# Patient Record
Sex: MALE | Race: BLACK OR AFRICAN AMERICAN | Employment: UNEMPLOYED | URBAN - METROPOLITAN AREA
[De-identification: names, ages, dates, MRNs, and addresses within clinical notes are randomized per-mention and may not be internally consistent; named-entity substitution may affect disease eponyms.]

---

## 2018-02-25 ENCOUNTER — HOSPITAL ENCOUNTER (EMERGENCY)
Age: 18
Discharge: HOME | End: 2018-02-25
Attending: EMERGENCY MEDICINE | Admitting: EMERGENCY MEDICINE
Payer: SELF-PAY

## 2018-02-25 ENCOUNTER — APPOINTMENT (OUTPATIENT)
Dept: GENERAL RADIOLOGY | Age: 18
End: 2018-02-25
Attending: EMERGENCY MEDICINE
Payer: SELF-PAY

## 2018-02-25 VITALS
RESPIRATION RATE: 18 BRPM | TEMPERATURE: 98.1 F | BODY MASS INDEX: 21.89 KG/M2 | OXYGEN SATURATION: 97 % | HEIGHT: 72 IN | SYSTOLIC BLOOD PRESSURE: 109 MMHG | WEIGHT: 161.6 LBS | HEART RATE: 70 BPM | DIASTOLIC BLOOD PRESSURE: 57 MMHG

## 2018-02-25 DIAGNOSIS — J45.901 MILD ASTHMA WITH ACUTE EXACERBATION, UNSPECIFIED WHETHER PERSISTENT: Primary | ICD-10-CM

## 2018-02-25 PROCEDURE — 94640 AIRWAY INHALATION TREATMENT: CPT

## 2018-02-25 PROCEDURE — 74011250637 HC RX REV CODE- 250/637: Performed by: EMERGENCY MEDICINE

## 2018-02-25 PROCEDURE — 74011000250 HC RX REV CODE- 250: Performed by: EMERGENCY MEDICINE

## 2018-02-25 PROCEDURE — 77030029684 HC NEB SM VOL KT MONA -A

## 2018-02-25 PROCEDURE — 71046 X-RAY EXAM CHEST 2 VIEWS: CPT

## 2018-02-25 PROCEDURE — 99283 EMERGENCY DEPT VISIT LOW MDM: CPT

## 2018-02-25 RX ORDER — ALBUTEROL SULFATE 90 UG/1
1 AEROSOL, METERED RESPIRATORY (INHALATION)
Status: COMPLETED | OUTPATIENT
Start: 2018-02-25 | End: 2018-02-25

## 2018-02-25 RX ORDER — PREDNISONE 20 MG/1
20 TABLET ORAL DAILY
Qty: 5 TAB | Refills: 0 | Status: SHIPPED | OUTPATIENT
Start: 2018-02-25 | End: 2018-03-02

## 2018-02-25 RX ORDER — IPRATROPIUM BROMIDE AND ALBUTEROL SULFATE 2.5; .5 MG/3ML; MG/3ML
SOLUTION RESPIRATORY (INHALATION)
Status: DISCONTINUED
Start: 2018-02-25 | End: 2018-02-25 | Stop reason: HOSPADM

## 2018-02-25 RX ORDER — ALBUTEROL SULFATE 90 UG/1
AEROSOL, METERED RESPIRATORY (INHALATION)
Status: DISCONTINUED
Start: 2018-02-25 | End: 2018-02-25 | Stop reason: HOSPADM

## 2018-02-25 RX ORDER — IPRATROPIUM BROMIDE AND ALBUTEROL SULFATE 2.5; .5 MG/3ML; MG/3ML
3 SOLUTION RESPIRATORY (INHALATION) ONCE
Status: COMPLETED | OUTPATIENT
Start: 2018-02-25 | End: 2018-02-25

## 2018-02-25 RX ORDER — ALBUTEROL SULFATE 90 UG/1
2 AEROSOL, METERED RESPIRATORY (INHALATION)
Qty: 1 INHALER | Refills: 0 | Status: SHIPPED | OUTPATIENT
Start: 2018-02-25 | End: 2018-12-12

## 2018-02-25 RX ADMIN — ALBUTEROL SULFATE 1 PUFF: 90 AEROSOL, METERED RESPIRATORY (INHALATION) at 01:21

## 2018-02-25 RX ADMIN — IPRATROPIUM BROMIDE AND ALBUTEROL SULFATE 3 ML: .5; 3 SOLUTION RESPIRATORY (INHALATION) at 01:21

## 2018-02-25 NOTE — LETTER
[address] 
[facility] EMERGENCY DEPT 
[address] [place] [address] [address] [id number] [phone] Work/School Note Date: 2/25/2018 To Whom It May concern: 
 
Rhiannon Marino was seen and treated today in the emergency room by the following provider(s): 
Attending Provider: Gloria Swain MD.   
 
Rhiannon Marino may return to work on 02/26/2018. Sincerely, Gloria Swain MD

## 2018-02-25 NOTE — ED PROVIDER NOTES
EMERGENCY DEPARTMENT HISTORY AND PHYSICAL EXAM      Date: 2/25/2018  Patient Name: oHrace Lomeli    History of Presenting Illness     Chief Complaint   Patient presents with    Shortness of Breath     Ambulatory to triage with steady gait. Reporting has been having sob and problems with asthma for the past week. Denies use of inhaler, does not have one. Denies f/c or chest pain at this time. History Provided By: Patient    HPI: Horace Lomeli, 21 y.o. male with PMHx significant for Asthma, presents ambulatory to the ED with cc of SOB x 1 week. Pt endorses associated wheezing, dry cough, and intermittent HA. Pt explains that he has a Hx of Asthma, and his current symptoms are consistent with past Asthma flares. Pt notes that he does not have an inhaler at home, and he did not receive his influenza vaccination for the season. Pt endorses a Hx of tobacco use, and a FHx of Asthma. Pt specifically denies fever, chills, chest pain, or abdominal pain. PCP: PROVIDER UNKNOWN    There are no other complaints, changes, or physical findings at this time. Current Outpatient Prescriptions   Medication Sig Dispense Refill    albuterol (PROVENTIL HFA, VENTOLIN HFA, PROAIR HFA) 90 mcg/actuation inhaler Take 2 Puffs by inhalation every four (4) hours as needed for Wheezing. 1 Inhaler 0    predniSONE (DELTASONE) 20 mg tablet Take 1 Tab by mouth daily for 5 days. With Breakfast 5 Tab 0       Past History     Past Medical History:  Past Medical History:   Diagnosis Date    ADHD (attention deficit hyperactivity disorder)     Asthma        Past Surgical History:  No past surgical history on file. Family History:  No family history on file. Social History:  Social History   Substance Use Topics    Smoking status: Never Smoker    Smokeless tobacco: Never Used    Alcohol use Not on file       Allergies:  No Known Allergies      Review of Systems   Review of Systems   Constitutional: Negative for fever.    HENT: Negative for congestion. Eyes: Negative for visual disturbance. Respiratory: Positive for cough, shortness of breath and wheezing. Cardiovascular: Negative for chest pain. Gastrointestinal: Negative for abdominal pain. Endocrine: Negative for heat intolerance. Genitourinary: Negative. Musculoskeletal: Negative for back pain. Skin: Negative for rash. Allergic/Immunologic: Negative for immunocompromised state. Neurological: Positive for headaches. Hematological: Does not bruise/bleed easily. Psychiatric/Behavioral: Negative. All other systems reviewed and are negative. Physical Exam   Physical Exam   Constitutional: He is oriented to person, place, and time. He appears well-developed and well-nourished. No acute distress   HENT:   Head: Normocephalic and atraumatic. Eyes: EOM are normal. Pupils are equal, round, and reactive to light. Neck: Normal range of motion. Neck supple. Cardiovascular: Normal rate, regular rhythm and normal heart sounds. Pulmonary/Chest: Effort normal and breath sounds normal. He has no wheezes. Abdominal: Soft. Bowel sounds are normal. There is no tenderness. Musculoskeletal: Normal range of motion. He exhibits no edema or tenderness. Neurological: He is alert and oriented to person, place, and time. No cranial nerve deficit. Skin: Skin is warm and dry. Psychiatric: He has a normal mood and affect. His behavior is normal.   Nursing note and vitals reviewed. Diagnostic Study Results     Radiologic Studies -     CXR Results  (Last 48 hours)               02/25/18 0148  XR CHEST PA LAT Final result    Impression:  IMPRESSION:       Normal PA and lateral chest views. Narrative:  EXAM:  XR CHEST PA LAT       INDICATION:  Shortness of breath and asthma. COMPARISON: None       TECHNIQUE: PA and lateral chest views       FINDINGS: The cardiomediastinal and hilar contours are within normal limits.  The   pulmonary vasculature is within normal limits. The lungs and pleural spaces are clear. The visualized bones and upper abdomen   are age-appropriate. Medical Decision Making   I am the first provider for this patient. I reviewed the vital signs, available nursing notes, past medical history, past surgical history, family history and social history. Vital Signs-Reviewed the patient's vital signs. Patient Vitals for the past 12 hrs:   Temp Pulse Resp BP SpO2   02/25/18 0120 98.1 °F (36.7 °C) 70 18 109/57 97 %   02/25/18 0023 98 °F (36.7 °C) (!) 103 16 123/64 98 %       Pulse Oximetry Analysis - 98% on RA    Cardiac Monitor:   Rate: 71 bpm  Rhythm: Normal Sinus Rhythm     Records Reviewed: Nursing Notes and Old Medical Records    Provider Notes (Medical Decision Making):   DDx: Asthma, bronchitis, PNA, URI    ED Course:   Initial assessment performed. The patients presenting problems have been discussed, and they are in agreement with the care plan formulated and outlined with them. I have encouraged them to ask questions as they arise throughout their visit. Tobacco Counseling  Discussed the risks of smoking and the benefits of smoking cessation as well as the long term sequelae of smoking with the patient. The patient verbalized their understanding. Disposition:  2:18 AM  The patient has been re-evaluated and is ready for discharge. Reviewed available results with patient. Counseled patient on diagnosis and care plan. Patient has expressed understanding, and all questions have been answered. Patient agrees with plan and agrees to follow up as recommended, or return to the ED if their symptoms worsen. Discharge instructions have been provided and explained to the patient, along with reasons to return to the ED. PLAN:  1.    Discharge Medication List as of 2/25/2018  2:18 AM      START taking these medications    Details   albuterol (PROVENTIL HFA, VENTOLIN HFA, PROAIR HFA) 90 mcg/actuation inhaler Take 2 Puffs by inhalation every four (4) hours as needed for Wheezing., Normal, Disp-1 Inhaler, R-0      predniSONE (DELTASONE) 20 mg tablet Take 1 Tab by mouth daily for 5 days. With Breakfast, Normal, Disp-5 Tab, R-0           2. Follow-up Information     Follow up With Details Comments Contact Info    see your Dr In 2 days As needed     [facility] EMERGENCY DEPT  If symptoms worsen [address] [place] [address]  [phone]        Return to ED if worse     Diagnosis     Clinical Impression:   1. Mild asthma with acute exacerbation, unspecified whether persistent        Attestations: This note is prepared by Alem Hazel, acting as Scribe for Kvng Haley MD.    Kvng Haley MD: The scribe's documentation has been prepared under my direction and personally reviewed by me in its entirety. I confirm that the note above accurately reflects all work, treatment, procedures, and medical decision making performed by me.

## 2018-02-25 NOTE — DISCHARGE INSTRUCTIONS
Asthma Attack: Care Instructions  Your Care Instructions    During an asthma attack, the airways swell and narrow. This makes it hard to breathe. Severe asthma attacks can be life-threatening, but you can help prevent them by keeping your asthma under control and treating symptoms before they get bad. Symptoms include being short of breath, having chest tightness, coughing, and wheezing. Noting and treating these symptoms can also help you avoid future trips to the emergency room. The doctor has checked you carefully, but problems can develop later. If you notice any problems or new symptoms, get medical treatment right away. Follow-up care is a key part of your treatment and safety. Be sure to make and go to all appointments, and call your doctor if you are having problems. It's also a good idea to know your test results and keep a list of the medicines you take. How can you care for yourself at home? · Follow your asthma action plan to prevent and treat attacks. If you don't have an asthma action plan, work with your doctor to create one. · Take your asthma medicines exactly as prescribed. Talk to your doctor right away if you have any questions about how to take them. ¨ Use your quick-relief medicine when you have symptoms of an attack. Quick-relief medicine is usually an albuterol inhaler. Some people need to use quick-relief medicine before they exercise. ¨ Take your controller medicine every day, not just when you have symptoms. Controller medicine is usually an inhaled corticosteroid. The goal is to prevent problems before they occur. Don't use your controller medicine to treat an attack that has already started. It doesn't work fast enough to help. ¨ If your doctor prescribed corticosteroid pills to use during an attack, take them exactly as prescribed. It may take hours for the pills to work, but they may make the episode shorter and help you breathe better.   ¨ Keep your quick-relief medicine with you at all times. · Talk to your doctor before using other medicines. Some medicines, such as aspirin, can cause asthma attacks in some people. · If you have a peak flow meter, use it to check how well you are breathing. This can help you predict when an asthma attack is going to occur. Then you can take medicine to prevent the asthma attack or make it less severe. · Do not smoke or allow others to smoke around you. Avoid smoky places. Smoking makes asthma worse. If you need help quitting, talk to your doctor about stop-smoking programs and medicines. These can increase your chances of quitting for good. · Learn what triggers an asthma attack for you, and avoid the triggers when you can. Common triggers include colds, smoke, air pollution, dust, pollen, mold, pets, cockroaches, stress, and cold air. · Avoid colds and the flu. Get a pneumococcal vaccine shot. If you have had one before, ask your doctor if you need a second dose. Get a flu vaccine every fall. If you must be around people with colds or the flu, wash your hands often. When should you call for help? Call 911 anytime you think you may need emergency care. For example, call if:  ? · You have severe trouble breathing. ?Call your doctor now or seek immediate medical care if:  ? · Your symptoms do not get better after you have followed your asthma action plan. ? · You have new or worse trouble breathing. ? · Your coughing and wheezing get worse. ? · You cough up dark brown or bloody mucus (sputum). ? · You have a new or higher fever. ? Watch closely for changes in your health, and be sure to contact your doctor if:  ? · You need to use quick-relief medicine on more than 2 days a week (unless it is just for exercise). ? · You cough more deeply or more often, especially if you notice more mucus or a change in the color of your mucus. ? · You are not getting better as expected. Where can you learn more?   Go to http://adriane-liam.info/. Enter D189 in the search box to learn more about \"Asthma Attack: Care Instructions. \"  Current as of: May 12, 2017  Content Version: 11.4  © 9576-4450 [facility]. Care instructions adapted under license by [employer] (which disclaims liability or warranty for this information). If you have questions about a medical condition or this instruction, always ask your healthcare professional. [address] any warranty or liability for your use of this information. Thank you! Thank you for allowing us to provide you with excellent care today. We hope we addressed all of your concerns and needs. We strive to provide excellent quality care in the Emergency Department. You will receive a survey after your visit to evaluate the care you were provided. Please rate us a level 5 (excellent), as anything less than excellent does not meet our goals. If you feel that you have not received excellent quality care or timely care, please ask to speak to the nurse manager. Please choose us in the future for your continued health care needs. ------------------------------------------------------------------------------------------------------------  The exam and treatment you received in the Emergency Department were for an urgent problem and are not intended as complete care. It is important that you follow up with a doctor, nurse practitioner, or physician assistant for ongoing care. If your symptoms become worse or you do not improve as expected and you are unable to reach your usual health care provider, you should return to the Emergency Department. We are available 24 hours a day. Please take your discharge instructions with you when you go to your follow-up appointment. If you have any problem arranging a follow-up appointment, contact the Emergency Department immediately.     If a prescription has been provided, please have it filled as soon as possible to prevent a delay in treatment. Read the entire medication instruction sheet provided to you by the pharmacy. If you have any questions or reservations about taking the medication due to side effects or interactions with other medications, please call your primary care physician or contact the ER to speak with the charge nurse. Make an appointment with your family doctor or the physician you were referred to for follow-up of this visit as instructed on your discharge paperwork, as this is mandatory follow-up. Return to the ER if you are unable to be seen or if you are unable to be seen in a timely manner. If you have any problem arranging the follow-up visit, contact the Emergency Department immediately.

## 2018-11-08 ENCOUNTER — HOSPITAL ENCOUNTER (EMERGENCY)
Age: 18
Discharge: HOME | End: 2018-11-08
Attending: EMERGENCY MEDICINE
Payer: SELF-PAY

## 2018-11-08 VITALS
OXYGEN SATURATION: 98 % | RESPIRATION RATE: 16 BRPM | HEART RATE: 76 BPM | HEIGHT: 72 IN | WEIGHT: 163.8 LBS | TEMPERATURE: 98.1 F | SYSTOLIC BLOOD PRESSURE: 119 MMHG | BODY MASS INDEX: 22.19 KG/M2 | DIASTOLIC BLOOD PRESSURE: 68 MMHG

## 2018-11-08 DIAGNOSIS — N49.2 ABSCESS, SCROTUM: Primary | ICD-10-CM

## 2018-11-08 PROCEDURE — 74011250637 HC RX REV CODE- 250/637

## 2018-11-08 PROCEDURE — 99283 EMERGENCY DEPT VISIT LOW MDM: CPT

## 2018-11-08 PROCEDURE — 74011250637 HC RX REV CODE- 250/637: Performed by: EMERGENCY MEDICINE

## 2018-11-08 RX ORDER — CEPHALEXIN 500 MG/1
500 CAPSULE ORAL 4 TIMES DAILY
Qty: 28 CAP | Refills: 0 | Status: SHIPPED | OUTPATIENT
Start: 2018-11-08 | End: 2018-11-15

## 2018-11-08 RX ORDER — SULFAMETHOXAZOLE AND TRIMETHOPRIM 800; 160 MG/1; MG/1
2 TABLET ORAL
Status: COMPLETED | OUTPATIENT
Start: 2018-11-08 | End: 2018-11-08

## 2018-11-08 RX ORDER — SULFAMETHOXAZOLE AND TRIMETHOPRIM 800; 160 MG/1; MG/1
1 TABLET ORAL 2 TIMES DAILY
Qty: 20 TAB | Refills: 0 | Status: SHIPPED | OUTPATIENT
Start: 2018-11-08 | End: 2018-11-18

## 2018-11-08 RX ORDER — CEPHALEXIN 250 MG/1
500 CAPSULE ORAL
Status: COMPLETED | OUTPATIENT
Start: 2018-11-08 | End: 2018-11-08

## 2018-11-08 RX ORDER — CEPHALEXIN 250 MG/1
CAPSULE ORAL
Status: COMPLETED
Start: 2018-11-08 | End: 2018-11-08

## 2018-11-08 RX ADMIN — CEPHALEXIN 500 MG: 250 CAPSULE ORAL at 23:21

## 2018-11-08 RX ADMIN — SULFAMETHOXAZOLE AND TRIMETHOPRIM 2 TABLET: 800; 160 TABLET ORAL at 23:20

## 2018-11-09 NOTE — ED NOTES
Pt discharged home with written and verbal instructions given by Dr. Queenie Benavides and patient ambulated out of ed without difficulty.

## 2018-11-09 NOTE — ED PROVIDER NOTES
EMERGENCY DEPARTMENT HISTORY AND PHYSICAL EXAM      Date: 11/8/2018  Patient Name: Rosario Alford    History of Presenting Illness     Chief Complaint   Patient presents with    Skin Problem     ambulatory to triage, scrotum, started 3-4 days ago, bled today, denies fever/chills       History Provided By: Patient    HPI: Rosario Alford, 19 y.o. male with PMHx significant for Asthma and abscess, presents ambulatory to the ED with cc of an red, gradually worsening, abscess on the left testicle beginning four days along with associated bloody drainage. Pt reports he had a rectal abscess in the past that went away with antibiotics. He states it was not as large as the one he has now. He endorses Advil, BC powder, and Vaseline with no relief. He denies any other alleviating or exacerbating factors. He specifically denies any fever or chills. Chief Complaint: Abscess  Duration: 4 Days  Timing:  Gradual and Worsening  Location: Left testicle  Quality: None  Severity: Mild  Modifying Factors: None  Associated Symptoms: redness and drainage    There are no other complaints, changes, or physical findings at this time. PCP: Unknown, Provider    Current Outpatient Medications   Medication Sig Dispense Refill    cephALEXin (KEFLEX) 500 mg capsule Take 1 Cap by mouth four (4) times daily for 7 days. 28 Cap 0    trimethoprim-sulfamethoxazole (BACTRIM DS) 160-800 mg per tablet Take 1 Tab by mouth two (2) times a day for 10 days. 20 Tab 0    albuterol (PROVENTIL HFA, VENTOLIN HFA, PROAIR HFA) 90 mcg/actuation inhaler Take 2 Puffs by inhalation every four (4) hours as needed for Wheezing. 1 Inhaler 0       Past History     Past Medical History:  Past Medical History:   Diagnosis Date    ADHD (attention deficit hyperactivity disorder)     Asthma        Past Surgical History:  No past surgical history on file. Family History:  No family history on file.     Social History:  Social History     Tobacco Use    Smoking status: Never Smoker    Smokeless tobacco: Never Used   Substance Use Topics    Alcohol use: Not on file    Drug use: Not on file       Allergies:  No Known Allergies      Review of Systems   Review of Systems   Constitutional: Negative for chills and fever. Respiratory: Negative for cough and shortness of breath. Cardiovascular: Negative for chest pain. Gastrointestinal: Negative for constipation, diarrhea, nausea and vomiting. Skin:        +abscess on the left testicle     Neurological: Negative for weakness and numbness. All other systems reviewed and are negative. Physical Exam   Physical Exam   Constitutional: He is oriented to person, place, and time. He appears well-developed and well-nourished. HENT:   Head: Normocephalic and atraumatic. Eyes: Conjunctivae and EOM are normal.   Neck: Normal range of motion. Neck supple. Cardiovascular: Normal rate and regular rhythm. Pulmonary/Chest: Effort normal and breath sounds normal. No respiratory distress. Abdominal: Soft. He exhibits no distension. There is no tenderness. Musculoskeletal: Normal range of motion. Neurological: He is alert and oriented to person, place, and time. Skin: Skin is warm and dry. Quarter size abscess on the left testicle. Tender to palpation and not actively draining. Psychiatric: He has a normal mood and affect. Nursing note and vitals reviewed. Medical Decision Making   I am the first provider for this patient. I reviewed the vital signs, available nursing notes, past medical history, past surgical history, family history and social history. Vital Signs-Reviewed the patient's vital signs. Patient Vitals for the past 12 hrs:   Temp Pulse Resp BP SpO2   11/08/18 2214 98.1 °F (36.7 °C) 76 16 119/68 98 %     Records Reviewed: Nursing Notes and Old Medical Records    Provider Notes (Medical Decision Making):   Patient presents with fluctuant warm painful lesion concerning for abscess.  No signs of sepsis at this time. Pt is refusing I&D. He will try antibiotics for the next 3 days. He states he will return if there are no improvements    ED Course:   Initial assessment performed. The patients presenting problems have been discussed, and they are in agreement with the care plan formulated and outlined with them. I have encouraged them to ask questions as they arise throughout their visit. PROGRESS NOTE:   11:17 PM  Pt verbalizes agreement with treatment plan. Disposition:  DISCHARGE NOTE  11:17 PM  The patient has been re-evaluated and is ready for discharge. Reviewed available results with patient and family. Counseled pt and family on diagnosis and care plan. Pt and family have expressed understanding, and all questions have been answered. Pt and family agree with plan and agree to F/U as recommended, or return to the ED if their sxs worsen. Discharge instructions have been provided and explained to the pt and their family, along with reasons to return to the ED. Written by Jason Bentley, ED Scribe, as dictated by Myriam Pereyra MD.    PLAN:  1. Current Discharge Medication List      START taking these medications    Details   cephALEXin (KEFLEX) 500 mg capsule Take 1 Cap by mouth four (4) times daily for 7 days. Qty: 28 Cap, Refills: 0      trimethoprim-sulfamethoxazole (BACTRIM DS) 160-800 mg per tablet Take 1 Tab by mouth two (2) times a day for 10 days. Qty: 20 Tab, Refills: 0           2. Follow-up Information     Follow up With Specialties Details Why Contact Info    [facility] In 3 days For wound re-check [address] [place] [address]  [phone]        Return to ED if worse     Diagnosis     Clinical Impression:   1. Abscess, scrotum        Attestations:   This note is prepared by Jason Bentley, acting as Scribe for Myriam Pereyra MD.    Myriam Pereyra MD: The scribe's documentation has been prepared under my direction and personally reviewed by me in its entirety. I confirm that the note above accurately reflects all work, treatment, procedures, and medical decision making performed by me. This note will not be viewable in [address]

## 2018-12-12 ENCOUNTER — HOSPITAL ENCOUNTER (EMERGENCY)
Age: 18
Discharge: HOME | End: 2018-12-12
Attending: EMERGENCY MEDICINE
Payer: SELF-PAY

## 2018-12-12 ENCOUNTER — APPOINTMENT (OUTPATIENT)
Dept: GENERAL RADIOLOGY | Age: 18
End: 2018-12-12
Attending: EMERGENCY MEDICINE
Payer: SELF-PAY

## 2018-12-12 VITALS
DIASTOLIC BLOOD PRESSURE: 64 MMHG | BODY MASS INDEX: 21.53 KG/M2 | TEMPERATURE: 97.8 F | OXYGEN SATURATION: 100 % | RESPIRATION RATE: 16 BRPM | SYSTOLIC BLOOD PRESSURE: 135 MMHG | HEIGHT: 72 IN | WEIGHT: 158.95 LBS | HEART RATE: 88 BPM

## 2018-12-12 DIAGNOSIS — Z72.0 TOBACCO ABUSE: ICD-10-CM

## 2018-12-12 DIAGNOSIS — J20.9 ACUTE BRONCHITIS, UNSPECIFIED ORGANISM: Primary | ICD-10-CM

## 2018-12-12 PROCEDURE — 99282 EMERGENCY DEPT VISIT SF MDM: CPT

## 2018-12-12 PROCEDURE — 71046 X-RAY EXAM CHEST 2 VIEWS: CPT

## 2018-12-12 RX ORDER — AZITHROMYCIN 250 MG/1
TABLET, FILM COATED ORAL
Qty: 6 TAB | Refills: 0 | Status: SHIPPED | OUTPATIENT
Start: 2018-12-12 | End: 2018-12-17

## 2018-12-12 RX ORDER — ALBUTEROL SULFATE 90 UG/1
2 AEROSOL, METERED RESPIRATORY (INHALATION)
Qty: 1 INHALER | Refills: 0 | Status: SHIPPED | OUTPATIENT
Start: 2018-12-12 | End: 2022-07-27

## 2018-12-12 RX ORDER — PREDNISONE 10 MG/1
TABLET ORAL
Qty: 21 TAB | Refills: 0 | Status: SHIPPED | OUTPATIENT
Start: 2018-12-12 | End: 2018-12-18

## 2018-12-12 RX ORDER — BENZONATATE 100 MG/1
100 CAPSULE ORAL
Qty: 30 CAP | Refills: 0 | Status: SHIPPED | OUTPATIENT
Start: 2018-12-12 | End: 2018-12-19

## 2018-12-12 NOTE — DISCHARGE INSTRUCTIONS
[facility] SYSTEMS Departments     For adult and child immunizations, family planning, TB screening, STD testing and women's health services. [facility]: [place] [phone]      [facility] 25   [address]   [address]   170 [facility]: Horacio Horan 200 [facility] [address] [phone]      [address]          [address] [place] [address]     For primary care services, woman and child wellness, and some clinics providing specialty care. [facility] -- [facility] [facility] 2525 [facility] [phone]   411 [facility] 200 [facility] 3614 [facility] [phone]   339 [facility] [address] [facility] [address] [phone] [address]  [employer] [address] [place] [address] 5850  Community  [phone]   [address] [phone]   [facility] 81 [facility] [phone]   [facility] [address] [place] [address] [phone]   [facility] 700 [address], ext [address] [place] [address], [address] [phone]     [facility] [address] [facility] [address] [phone]   Health system Outreach 5850  Community  [phone]   [employer]  [address], [address] ([email or web address]) [phone]         Sexual Health/Woman Wellness Clinics    For STD/HIV testing and treatment, pregnancy testing and services, men's health, birth control services, LGBT services, and hepatitis/HPV vaccine services. [employer] [facility] [place] [employer] 201 [address]. [facility] 75 [facility] [address] [facility] 1579 [address] [phone]   [facility] [address], 5th floor [phone]   Pregnancy [address] [facility] [address]  Gunnar Sánchez [phone]         Specialty Service [address]    [phone]   [facility]   [phone]   Women, Infant and Children's Services: [address] [phone]       600 [facility]   [phone]   [address]   [facility]     [phone]   [address] Crisis   [address] [phone]     Local Primary Care Physicians  [facility] [phone]  MD Frances Renteria MD Lida Fiddler, MD [facility] [phone]  Andrey Carter, P  MD Tomás Valencia MD Abron Bale, MD [address] [address] [phone]  MD Pepper Paz MD [address] [facility] [phone]  MD Rio Carter MD Emeline Hoffmann, MD Armando Baron, MD   [facility] [phone]  MD Keira MURPHY MD Tyronne Gallery, NP [facility] [phone]  [place], MD Parish Siegel MD Marquetta Raker, MD Alyne Kohler, MD Lorena Carson, MD Mindi Artist, MD   33 57 [facility]  Des Marshall MD [facility] [phone]  Leydi Hayes MD  [place] Rod, NP  MD Geoff Black MD Lyndell Pea, MD Bethel Arista, MD Devorah Oto, MD   6596 [facility] [phone]  MD Yandel Zamudio, P  Cricket Sarabia, NP  MD Raven Juarez MD Sabino Dinning, MD Era Barber, MD 8753 [facility] [phone]  Kelsey Angle, MD Karole Friedlander, MD Sidonie Buys, MD France Dubin, MD Sandrea Marts, MD   [address] [phone]  MD Artemio Petersen MD [facility] [phone]  MD Clemencia Smith MD Dorrie Reap [facility], [address] [facility] [phone]  MD Carlton Mcneal MD Rainelle Mask, MD Gloriann Schanz, MD Dionisio Andes, MD Sharlet Chi, ALEX Valdez MD 1619  66   [phone]  MD Jann Bower MD Fontaine Hones, MD   2102 [facility] [phone]  [address] [place] [address] MD Alberto Morocho, FNP  Dania Perez, ROB Perez, P  ROB White MD Staci Halo, ALEX May, DO Miscellaneous:  Rosa Esqueda -[phone]           Bronchitis: Care Instructions  Your Care Instructions    Bronchitis is inflammation of the bronchial tubes, which carry air to the lungs. The tubes swell and produce mucus, or phlegm. The mucus and inflamed bronchial tubes make you cough. You may have trouble breathing. Most cases of bronchitis are caused by viruses like those that cause colds. Antibiotics usually do not help and they may be harmful. Bronchitis usually develops rapidly and lasts about 2 to 3 weeks in otherwise healthy people. Follow-up care is a key part of your treatment and safety. Be sure to make and go to all appointments, and call your doctor if you are having problems. It's also a good idea to know your test results and keep a list of the medicines you take. How can you care for yourself at home? · Take all medicines exactly as prescribed. Call your doctor if you think you are having a problem with your medicine. · Get some extra rest.  · Take an over-the-counter pain medicine, such as acetaminophen (Tylenol), ibuprofen (Advil, Motrin), or naproxen (Aleve) to reduce fever and relieve body aches. Read and follow all instructions on the label. · Do not take two or more pain medicines at the same time unless the doctor told you to. Many pain medicines have acetaminophen, which is Tylenol.  Too much acetaminophen (Tylenol) can be harmful. · Take an over-the-counter cough medicine that contains dextromethorphan to help quiet a dry, hacking cough so that you can sleep. Avoid cough medicines that have more than one active ingredient. Read and follow all instructions on the label. · Breathe moist air from a humidifier, hot shower, or sink filled with hot water. The heat and moisture will thin mucus so you can cough it out. · Do not smoke. Smoking can make bronchitis worse. If you need help quitting, talk to your doctor about stop-smoking programs and medicines. These can increase your chances of quitting for good. When should you call for help? Call 911 anytime you think you may need emergency care. For example, call if:    · You have severe trouble breathing.    Call your doctor now or seek immediate medical care if:    · You have new or worse trouble breathing.     · You cough up dark brown or bloody mucus (sputum).     · You have a new or higher fever.     · You have a new rash.    Watch closely for changes in your health, and be sure to contact your doctor if:    · You cough more deeply or more often, especially if you notice more mucus or a change in the color of your mucus.     · You are not getting better as expected. Where can you learn more? Go to http://adriane-liam.info/. Enter H333 in the search box to learn more about \"Bronchitis: Care Instructions. \"  Current as of: December 6, 2017  Content Version: 11.8  © 2848-2911 [employer]. Care instructions adapted under license by [employer] (which disclaims liability or warranty for this information). If you have questions about a medical condition or this instruction, always ask your healthcare professional. [place] [address] any warranty or liability for your use of this information. Learning About Benefits From Quitting Smoking  How does quitting smoking make you healthier?     If you're thinking about quitting smoking, you may have a few reasons to be smoke-free. Your health may be one of them. · When you quit smoking, you lower your risks for cancer, lung disease, heart attack, stroke, blood vessel disease, and blindness from macular degeneration. · When you're smoke-free, you get sick less often, and you heal faster. You are less likely to get colds, flu, bronchitis, and pneumonia. · As a nonsmoker, you may find that your mood is better and you are less stressed. When and how will you feel healthier? Quitting has real health benefits that start from day 1 of being smoke-free. And the longer you stay smoke-free, the healthier you get and the better you feel. The first hours  · After just 20 minutes, your blood pressure and heart rate go down. That means there's less stress on your heart and blood vessels. · Within 12 hours, the level of carbon monoxide in your blood drops back to normal. That makes room for more oxygen. With more oxygen in your body, you may notice that you have more energy than when you smoked. After 2 weeks  · Your lungs start to work better. · Your risk of heart attack starts to drop. After 1 month  · When your lungs are clear, you cough less and breathe deeper, so it's easier to be active. · Your sense of taste and smell return. That means you can enjoy food more than you have since you started smoking. Over the years  · After 1 year, your risk of heart disease is half what it would be if you kept smoking. · After 5 years, your risk of stroke starts to shrink. Within a few years after that, it's about the same as if you'd never smoked. · After 10 years, your risk of dying from lung cancer is cut by about half. And your risk for many other types of cancer is lower too. How would quitting help others in your life? When you quit smoking, you improve the health of everyone who now breathes in your smoke. · Their heart, lung, and cancer risks drop, much like yours.   · They are sick less. For babies and small children, living smoke-free means they're less likely to have ear infections, pneumonia, and bronchitis. · If you're a woman who is or will be pregnant someday, quitting smoking means a healthier . · Children who are close to you are less likely to become adult smokers. Where can you learn more? Go to http://adriane-liam.info/. Enter 052 [id number] [id number] 11 in the search box to learn more about \"Learning About Benefits From Quitting Smoking. \"  Current as of: 2017  Content Version: 11.8  © 0217-3422 [employer], [employer] Care instructions adapted under license by [employer] (which disclaims liability or warranty for this information). If you have questions about a medical condition or this instruction, always ask your healthcare professional. [address] any warranty or liability for your use of this information.

## 2018-12-12 NOTE — ED PROVIDER NOTES
EMERGENCY DEPARTMENT HISTORY AND PHYSICAL EXAM      Date: 12/12/2018  Patient Name: Dre Bender    History of Presenting Illness     Chief Complaint   Patient presents with    Chest Congestion     pt reports cough and chest congestion x4 days    Cough       History Provided By: Patient    HPI: Dre Bender, 19 y.o. male presents ambulatory to the ED with URI complaints x 4-5 days. The patient notes a productive cough, rhinorrhea, right ear pain, and general malaise. He denies sick contacts. He has not received his flu vaccination this season. He has take VICKS and Veronica seltzer without relief of Sx. There are no other complaints, changes, or physical findings at this time. Social Hx: Tobacco (1 pack of black and milds daily), EtOH (rare), Illicit drug use (denies)     PCP: Unknown, Provider    Current Outpatient Medications   Medication Sig Dispense Refill    albuterol (PROVENTIL HFA, VENTOLIN HFA, PROAIR HFA) 90 mcg/actuation inhaler Take 2 Puffs by inhalation every four (4) hours as needed for Wheezing. 1 Inhaler 0    benzonatate (TESSALON PERLES) 100 mg capsule Take 1 Cap by mouth three (3) times daily as needed for Cough for up to 7 days. 30 Cap 0    azithromycin (ZITHROMAX Z-AIMEE) 250 mg tablet Take 2 tabs today; then take 1 tab daily for 4 days 6 Tab 0    predniSONE (STERAPRED DS) 10 mg dose pack Standard 6 day taper 21 Tab 0       Past History     Past Medical History:  Past Medical History:   Diagnosis Date    ADHD (attention deficit hyperactivity disorder)     Asthma        Past Surgical History:  History reviewed. No pertinent surgical history. Family History:  History reviewed. No pertinent family history.     Social History:  Social History     Tobacco Use    Smoking status: Current Every Day Smoker    Smokeless tobacco: Never Used   Substance Use Topics    Alcohol use: No     Frequency: Never    Drug use: No       Allergies:  No Known Allergies      Review of Systems   Review of Systems   Constitutional: Negative for chills, diaphoresis and fever. HENT: Positive for congestion, ear pain and rhinorrhea. Negative for sore throat. Respiratory: Positive for cough. Negative for shortness of breath. Cardiovascular: Negative for chest pain. Gastrointestinal: Negative for abdominal pain, constipation, diarrhea, nausea and vomiting. Genitourinary: Negative for difficulty urinating, dysuria, frequency and hematuria. Musculoskeletal: Negative for arthralgias and myalgias. Neurological: Negative for headaches. All other systems reviewed and are negative. Physical Exam   Physical Exam   Constitutional: He is oriented to person, place, and time. He appears well-developed and well-nourished. No distress. 19 y.o. -American male    HENT:   Head: Normocephalic and atraumatic. Right Ear: Tympanic membrane, external ear and ear canal normal. Tympanic membrane is not injected. No middle ear effusion. Left Ear: Tympanic membrane, external ear and ear canal normal. Tympanic membrane is not injected. No middle ear effusion. Nose: Rhinorrhea present. Right sinus exhibits no maxillary sinus tenderness and no frontal sinus tenderness. Left sinus exhibits no maxillary sinus tenderness and no frontal sinus tenderness. Mouth/Throat: Uvula is midline and mucous membranes are normal. Posterior oropharyngeal erythema present. No oropharyngeal exudate or posterior oropharyngeal edema. Eyes: Conjunctivae are normal. Right eye exhibits no discharge. Left eye exhibits no discharge. Neck: Normal range of motion. Neck supple. Cardiovascular: Normal rate, regular rhythm and normal heart sounds. No murmur heard. Pulmonary/Chest: Effort normal and breath sounds normal. No respiratory distress. Non-productive cough. Speaking clearly in complete sentences. Lymphadenopathy:     He has no cervical adenopathy. Neurological: He is alert and oriented to person, place, and time. Skin: Skin is warm and dry. He is not diaphoretic. Psychiatric: He has a normal mood and affect. His behavior is normal.   Nursing note and vitals reviewed. Diagnostic Study Results     Labs - None    Radiologic Studies -   CXR Results  (Last 48 hours)               12/12/18 1156  XR CHEST PA LAT Final result    Impression:   impression: No acute changes. Narrative:  Clinical indication: Cough. Frontal and lateral views of the chest obtained, comparison February 25. The a   heart size is normal. There is no acute infiltrate. Medical Decision Making   I am the first provider for this patient. I reviewed the vital signs, available nursing notes, past medical history, past surgical history, family history and social history. Vital Signs-Reviewed the patient's vital signs. Patient Vitals for the past 12 hrs:   Temp Pulse Resp BP SpO2   12/12/18 1224 -- -- -- -- 100 %   12/12/18 1128 97.8 °F (36.6 °C) 88 16 135/64 100 %     Records Reviewed: Nursing Notes    Provider Notes (Medical Decision Making):   Bronchitis, PNA, viral URI, RAD    ED Course:   Initial assessment performed. The patients presenting problems have been discussed, and they are in agreement with the care plan formulated and outlined with them. I have encouraged them to ask questions as they arise throughout their visit. Tobacco Counseling  Discussed the risks of smoking and the benefits of smoking cessation as well as the long term sequelae of smoking with the patient. The patient verbalized their understanding. Counseled patient for approximately 5 minutes. Critical Care Time:   None    Disposition:  DISCHARGE NOTE:  12:27 PM  The pt is ready for discharge. The pt's signs, symptoms, diagnosis, and discharge instructions have been discussed and pt has conveyed their understanding. The pt is to follow up as recommended or return to ER should their symptoms worsen.  Plan has been discussed and pt is in agreement. PLAN:  1. Current Discharge Medication List      START taking these medications    Details   benzonatate (TESSALON PERLES) 100 mg capsule Take 1 Cap by mouth three (3) times daily as needed for Cough for up to 7 days. Qty: 30 Cap, Refills: 0      azithromycin (ZITHROMAX Z-AIMEE) 250 mg tablet Take 2 tabs today; then take 1 tab daily for 4 days  Qty: 6 Tab, Refills: 0      predniSONE (STERAPRED DS) 10 mg dose pack Standard 6 day taper  Qty: 21 Tab, Refills: 0         CONTINUE these medications which have CHANGED    Details   albuterol (PROVENTIL HFA, VENTOLIN HFA, PROAIR HFA) 90 mcg/actuation inhaler Take 2 Puffs by inhalation every four (4) hours as needed for Wheezing. Qty: 1 Inhaler, Refills: 0           2. Follow-up Information     Follow up With Specialties Details Why Contact Info    Your PCP  In 1 week As needed - Please use the attached list of facilities to locate one to meet your non-emergent medical needs       3. Stop smoking  4. Drink plenty of fluids  5. Continue taking over the counter cough/cold medications. Return to ED if worse     Diagnosis     Clinical Impression:   1. Acute bronchitis, unspecified organism    2. Tobacco abuse          This note will not be viewable in MyChart.

## 2018-12-12 NOTE — LETTER
[facility] EMERGENCY DEPT 
[address] [place] [address] [address] [id number] 
[phone] Work/School Note Date: 12/12/2018 To Whom It May concern: 
 
Nelly Guerra was seen and treated today in the emergency room by the following provider(s): 
Attending Provider: Lisa Ramachandran MD 
Physician Assistant: Dinah Castro. Please excuse Nelly Guerra from work today and tomorrow. Sincerely, Dinah Almendarez

## 2018-12-14 ENCOUNTER — HOSPITAL ENCOUNTER (EMERGENCY)
Age: 18
Discharge: HOME | End: 2018-12-14
Attending: EMERGENCY MEDICINE
Payer: SELF-PAY

## 2018-12-14 VITALS
HEART RATE: 66 BPM | TEMPERATURE: 97.7 F | SYSTOLIC BLOOD PRESSURE: 127 MMHG | DIASTOLIC BLOOD PRESSURE: 70 MMHG | WEIGHT: 159.83 LBS | HEIGHT: 72 IN | BODY MASS INDEX: 21.65 KG/M2 | OXYGEN SATURATION: 96 % | RESPIRATION RATE: 16 BRPM

## 2018-12-14 DIAGNOSIS — Z72.0 TOBACCO ABUSE: ICD-10-CM

## 2018-12-14 DIAGNOSIS — J20.9 ACUTE BRONCHITIS, UNSPECIFIED ORGANISM: Primary | ICD-10-CM

## 2018-12-14 PROCEDURE — 99282 EMERGENCY DEPT VISIT SF MDM: CPT

## 2018-12-14 NOTE — LETTER
[facility] EMERGENCY DEPT 
[address] [place] [address] [address] [id number] [phone] Work/School Note Date: 12/14/2018 To Whom It May concern: 
 
Dre Bender was seen and treated today in the emergency room by the following provider(s): 
Attending Provider: Belen Conner MD 
Physician Assistant: Dinah Barbour. Please allow Jamari Cordon to be late to work today. She was in the ER with the above patient who was discharged at 11:30 AM. Sincerely, Dinah Swain

## 2018-12-14 NOTE — DISCHARGE INSTRUCTIONS
Bronchitis: Care Instructions  Your Care Instructions    Bronchitis is inflammation of the bronchial tubes, which carry air to the lungs. The tubes swell and produce mucus, or phlegm. The mucus and inflamed bronchial tubes make you cough. You may have trouble breathing. Most cases of bronchitis are caused by viruses like those that cause colds. Antibiotics usually do not help and they may be harmful. Bronchitis usually develops rapidly and lasts about 2 to 3 weeks in otherwise healthy people. Follow-up care is a key part of your treatment and safety. Be sure to make and go to all appointments, and call your doctor if you are having problems. It's also a good idea to know your test results and keep a list of the medicines you take. How can you care for yourself at home? · Take all medicines exactly as prescribed. Call your doctor if you think you are having a problem with your medicine. · Get some extra rest.  · Take an over-the-counter pain medicine, such as acetaminophen (Tylenol), ibuprofen (Advil, Motrin), or naproxen (Aleve) to reduce fever and relieve body aches. Read and follow all instructions on the label. · Do not take two or more pain medicines at the same time unless the doctor told you to. Many pain medicines have acetaminophen, which is Tylenol. Too much acetaminophen (Tylenol) can be harmful. · Take an over-the-counter cough medicine that contains dextromethorphan to help quiet a dry, hacking cough so that you can sleep. Avoid cough medicines that have more than one active ingredient. Read and follow all instructions on the label. · Breathe moist air from a humidifier, hot shower, or sink filled with hot water. The heat and moisture will thin mucus so you can cough it out. · Do not smoke. Smoking can make bronchitis worse. If you need help quitting, talk to your doctor about stop-smoking programs and medicines. These can increase your chances of quitting for good.   When should you call for help? Call 911 anytime you think you may need emergency care. For example, call if:    · You have severe trouble breathing.    Call your doctor now or seek immediate medical care if:    · You have new or worse trouble breathing.     · You cough up dark brown or bloody mucus (sputum).     · You have a new or higher fever.     · You have a new rash.    Watch closely for changes in your health, and be sure to contact your doctor if:    · You cough more deeply or more often, especially if you notice more mucus or a change in the color of your mucus.     · You are not getting better as expected. Where can you learn more? Go to http://adriane-liam.info/. Enter H333 in the search box to learn more about \"Bronchitis: Care Instructions. \"  Current as of: December 6, 2017  Content Version: 11.8  © 2463-8332 [employer]. Care instructions adapted under license by [employer] (which disclaims liability or warranty for this information). If you have questions about a medical condition or this instruction, always ask your healthcare professional. [address] any warranty or liability for your use of this information.

## 2018-12-14 NOTE — ED PROVIDER NOTES
EMERGENCY DEPARTMENT HISTORY AND PHYSICAL EXAM      Date: 12/14/2018  Patient Name: Rosario Alford    History of Presenting Illness     Chief Complaint   Patient presents with    Cough     Cold sx worse today, pt seen on Tuesday, unable to  and start any meds. History Provided By: Patient    HPI: Rosario Alford, 19 y.o. male returns to the ED with cc of cough and cold Sx. He was seen in the ED several days ago for the same complaint and has not filled any of the Rx medications provided noting that the total cost was $160 and he does not have the money to pay for them. He continues to have a non-productive cough, congestion and ST. There has been no treatment PTA. There are no other complaints, changes, or physical findings at this time. Social Hx: Tobacco (1/2 ppd), EtOH (denies), Illicit drug use (denies)     PCP: None    Current Outpatient Medications   Medication Sig Dispense Refill    albuterol (PROVENTIL HFA, VENTOLIN HFA, PROAIR HFA) 90 mcg/actuation inhaler Take 2 Puffs by inhalation every four (4) hours as needed for Wheezing. 1 Inhaler 0    benzonatate (TESSALON PERLES) 100 mg capsule Take 1 Cap by mouth three (3) times daily as needed for Cough for up to 7 days. 30 Cap 0    azithromycin (ZITHROMAX Z-AIMEE) 250 mg tablet Take 2 tabs today; then take 1 tab daily for 4 days 6 Tab 0    predniSONE (STERAPRED DS) 10 mg dose pack Standard 6 day taper 21 Tab 0       Past History     Past Medical History:  Past Medical History:   Diagnosis Date    ADHD (attention deficit hyperactivity disorder)     Asthma        Past Surgical History:  History reviewed. No pertinent surgical history. Family History:  History reviewed. No pertinent family history.     Social History:  Social History     Tobacco Use    Smoking status: Current Every Day Smoker    Smokeless tobacco: Never Used   Substance Use Topics    Alcohol use: No     Frequency: Never    Drug use: No       Allergies:  No Known Allergies      Review of Systems   Review of Systems   Constitutional: Negative for chills, diaphoresis and fever. HENT: Positive for congestion and sore throat. Negative for ear pain and rhinorrhea. Respiratory: Positive for cough. Negative for shortness of breath. Cardiovascular: Negative for chest pain. Gastrointestinal: Negative for abdominal pain, constipation, diarrhea, nausea and vomiting. Genitourinary: Negative for difficulty urinating, dysuria, frequency and hematuria. Musculoskeletal: Negative for arthralgias and myalgias. Neurological: Negative for headaches. All other systems reviewed and are negative. Physical Exam   Physical Exam   Constitutional: He is oriented to person, place, and time. He appears well-developed and well-nourished. No distress. 19 y.o. -American male    HENT:   Head: Normocephalic and atraumatic. Right Ear: Tympanic membrane, external ear and ear canal normal. No middle ear effusion. Left Ear: Tympanic membrane, external ear and ear canal normal.  No middle ear effusion. Nose: No rhinorrhea. Right sinus exhibits no maxillary sinus tenderness and no frontal sinus tenderness. Left sinus exhibits no maxillary sinus tenderness and no frontal sinus tenderness. Mouth/Throat: Uvula is midline. No oropharyngeal exudate, posterior oropharyngeal edema or posterior oropharyngeal erythema. Eyes: Conjunctivae are normal. Right eye exhibits no discharge. Left eye exhibits no discharge. Neck: Normal range of motion. Neck supple. Cardiovascular: Normal rate, regular rhythm and normal heart sounds. No murmur heard. Pulmonary/Chest: Effort normal and breath sounds normal. No respiratory distress. Non-productive cough. Speaking clearly in complete sentences. Neurological: He is alert and oriented to person, place, and time. Skin: Skin is warm and dry. He is not diaphoretic. Psychiatric: He has a normal mood and affect.  His behavior is normal. Nursing note and vitals reviewed. Diagnostic Study Results     Labs - None    Radiologic Studies - (Radiology result from below from ED visit 2 days ago.)  CXR Results  (Last 48 hours)               12/12/18 1156  XR CHEST PA LAT Final result    Impression:   impression: No acute changes. Narrative:  Clinical indication: Cough. Frontal and lateral views of the chest obtained, comparison February 25. The a   heart size is normal. There is no acute infiltrate. Medical Decision Making   I am the first provider for this patient. I reviewed the vital signs, available nursing notes, past medical history, past surgical history, family history and social history. Vital Signs-Reviewed the patient's vital signs. Patient Vitals for the past 12 hrs:   Temp Pulse Resp BP SpO2   12/14/18 1035 97.7 °F (36.5 °C) 66 16 127/70 96 %     Records Reviewed: Nursing Notes and Old Medical Records    Provider Notes (Medical Decision Making):   Bronchitis, PNA, viral URI, seasonal allergies, medication non-complicant, tobacco abuse    ED Course:   Initial assessment performed. The patients presenting problems have been discussed, and they are in agreement with the care plan formulated and outlined with them. I have encouraged them to ask questions as they arise throughout their visit. Critical Care Time:   None    Disposition:  DISCHARGE NOTE:  10:58 AM  The pt is ready for discharge. The pt's signs, symptoms, diagnosis, and discharge instructions have been discussed and pt has conveyed their understanding. The pt is to follow up as recommended or return to ER should their symptoms worsen. Plan has been discussed and pt is in agreement. PLAN:  1.    Current Discharge Medication List      CONTINUE these medications which have NOT CHANGED    Details   albuterol (PROVENTIL HFA, VENTOLIN HFA, PROAIR HFA) 90 mcg/actuation inhaler Take 2 Puffs by inhalation every four (4) hours as needed for Wheezing. Qty: 1 Inhaler, Refills: 0      benzonatate (TESSALON PERLES) 100 mg capsule Take 1 Cap by mouth three (3) times daily as needed for Cough for up to 7 days. Qty: 30 Cap, Refills: 0      azithromycin (ZITHROMAX Z-AIMEE) 250 mg tablet Take 2 tabs today; then take 1 tab daily for 4 days  Qty: 6 Tab, Refills: 0      predniSONE (STERAPRED DS) 10 mg dose pack Standard 6 day taper  Qty: 21 Tab, Refills: 0           2. Follow-up Information     Follow up With Specialties Details Why Contact Info    Your PCP  In 1 week As needed - Please use the attched list of facilities to locate one to meet your non-emergent medical needs       3. Drink plenty of fluids  4. Take over the counter cough/cold medications as needed  Return to ED if worse     Diagnosis     Clinical Impression:   1. Acute bronchitis, unspecified organism    2. Tobacco abuse          This note will not be viewable in MyChart.

## 2019-02-12 ENCOUNTER — OFFICE VISIT (OUTPATIENT)
Dept: URGENT CARE | Age: 19
End: 2019-02-12

## 2019-02-12 VITALS
HEART RATE: 67 BPM | SYSTOLIC BLOOD PRESSURE: 119 MMHG | RESPIRATION RATE: 16 BRPM | TEMPERATURE: 97.8 F | WEIGHT: 166 LBS | OXYGEN SATURATION: 98 % | BODY MASS INDEX: 23.24 KG/M2 | HEIGHT: 71 IN | DIASTOLIC BLOOD PRESSURE: 58 MMHG

## 2019-02-12 DIAGNOSIS — J40 WHEEZY BRONCHITIS: Primary | ICD-10-CM

## 2019-02-12 RX ORDER — AZITHROMYCIN 250 MG/1
TABLET, FILM COATED ORAL
Qty: 6 TAB | Refills: 0 | Status: ON HOLD | OUTPATIENT
Start: 2019-02-12 | End: 2021-10-14

## 2019-02-12 RX ORDER — PREDNISONE 10 MG/1
TABLET ORAL
Qty: 21 TAB | Refills: 0 | Status: ON HOLD | OUTPATIENT
Start: 2019-02-12 | End: 2021-10-14

## 2019-02-12 NOTE — PATIENT INSTRUCTIONS
Follow up with PCP without improvement over next 5-7 days sooner/immediately for new or worsening      Bronchitis: Care Instructions  Your Care Instructions    Bronchitis is inflammation of the bronchial tubes, which carry air to the lungs. The tubes swell and produce mucus, or phlegm. The mucus and inflamed bronchial tubes make you cough. You may have trouble breathing. Most cases of bronchitis are caused by viruses like those that cause colds. Antibiotics usually do not help and they may be harmful. Bronchitis usually develops rapidly and lasts about 2 to 3 weeks in otherwise healthy people. Follow-up care is a key part of your treatment and safety. Be sure to make and go to all appointments, and call your doctor if you are having problems. It's also a good idea to know your test results and keep a list of the medicines you take. How can you care for yourself at home? · Take all medicines exactly as prescribed. Call your doctor if you think you are having a problem with your medicine. · Get some extra rest.  · Take an over-the-counter pain medicine, such as acetaminophen (Tylenol), ibuprofen (Advil, Motrin), or naproxen (Aleve) to reduce fever and relieve body aches. Read and follow all instructions on the label. · Do not take two or more pain medicines at the same time unless the doctor told you to. Many pain medicines have acetaminophen, which is Tylenol. Too much acetaminophen (Tylenol) can be harmful. · Take an over-the-counter cough medicine that contains dextromethorphan to help quiet a dry, hacking cough so that you can sleep. Avoid cough medicines that have more than one active ingredient. Read and follow all instructions on the label. · Breathe moist air from a humidifier, hot shower, or sink filled with hot water. The heat and moisture will thin mucus so you can cough it out. · Do not smoke. Smoking can make bronchitis worse.  If you need help quitting, talk to your doctor about stop-smoking programs and medicines. These can increase your chances of quitting for good. When should you call for help? Call 911 anytime you think you may need emergency care. For example, call if:    · You have severe trouble breathing.    Call your doctor now or seek immediate medical care if:    · You have new or worse trouble breathing.     · You cough up dark brown or bloody mucus (sputum).     · You have a new or higher fever.     · You have a new rash.    Watch closely for changes in your health, and be sure to contact your doctor if:    · You cough more deeply or more often, especially if you notice more mucus or a change in the color of your mucus.     · You are not getting better as expected. Where can you learn more? Go to http://adriane-liam.info/. Enter H333 in the search box to learn more about \"Bronchitis: Care Instructions. \"  Current as of: September 5, 2018  Content Version: 11.9  © 9946-1096 [employer]. Care instructions adapted under license by [employer] (which disclaims liability or warranty for this information). If you have questions about a medical condition or this instruction, always ask your healthcare professional. [address] any warranty or liability for your use of this information.

## 2019-02-12 NOTE — LETTER
NOTIFICATION RETURN TO WORK / SCHOOL 
 
2/12/2019 5:19 PM 
 
Mr. [address] [address] [place] [address] To Whom It May Concern: 
 
Jameel Ramirez is currently under the care of [facility]. He will return to work/school on: 02/14 OR 02/15/2019 If there are questions or concerns please have the patient contact our office. Sincerely, GHE PROVIDER

## 2019-02-12 NOTE — PROGRESS NOTES
Cough x 1 week  Tight to occasionally productive  promotes hx of asthma has been using inhaler at home daily  No fever, chills, weakness or vomiting  Overall not improving. Denies any other symptoms. Is a daily smoker               Past Medical History:   Diagnosis Date    ADHD (attention deficit hyperactivity disorder)     Asthma         History reviewed. No pertinent surgical history. History reviewed. No pertinent family history. Social History     Socioeconomic History    Marital status: SINGLE     Spouse name: Not on file    Number of children: Not on file    Years of education: Not on file    Highest education level: Not on file   Social Needs    Financial resource strain: Not on file    Food insecurity - worry: Not on file    Food insecurity - inability: Not on file    Transportation needs - medical: Not on file   [employer] needs - non-medical: Not on file   Occupational History    Not on file   Tobacco Use    Smoking status: Current Every Day Smoker    Smokeless tobacco: Never Used   Substance and Sexual Activity    Alcohol use: No     Frequency: Never    Drug use: No    Sexual activity: Not on file   Other Topics Concern    Not on file   Social History Narrative    Not on file                ALLERGIES: Patient has no known allergies. Review of Systems   All other systems reviewed and are negative. Vitals:    02/12/19 1659   BP: 119/58   Pulse: 67   Resp: 16   Temp: 97.8 °F (36.6 °C)   SpO2: 98%   Weight: 166 lb (75.3 kg)   Height: 5' 11\" (1.803 m)       Physical Exam   Constitutional: He is oriented to person, place, and time. No distress. Non-toxic appearing, well hydrated   HENT:   Head: Normocephalic and atraumatic. Nose: Nose normal.   Mouth/Throat: Oropharynx is clear and moist. No oropharyngeal exudate. Eyes: Conjunctivae and EOM are normal. Pupils are equal, round, and reactive to light. No scleral icterus. Neck: Normal range of motion.  Neck supple. Cardiovascular: Normal rate, regular rhythm and normal heart sounds. Exam reveals no gallop and no friction rub. No murmur heard. Pulmonary/Chest: Effort normal. No respiratory distress. He has wheezes. He has no rales. He exhibits no tenderness. Lymphadenopathy:     He has no cervical adenopathy. Neurological: He is alert and oriented to person, place, and time. Skin: Skin is warm and dry. No rash noted. He is not diaphoretic. No erythema. No pallor. Psychiatric: He has a normal mood and affect. His behavior is normal. Thought content normal.   Nursing note and vitals reviewed. MDM     Differential Diagnosis; Clinical Impression; Plan:       CLINICAL IMPRESSION:  (J40) Wheezy bronchitis  (primary encounter diagnosis)    Orders Placed This Encounter      predniSONE (STERAPRED DS) 10 mg dose pack          Sig: Take per dose pack instructions          Dispense:  21 Tab          Refill:  0      azithromycin (ZITHROMAX) 250 mg tablet          Sig: Take 2 tablets on day 1 then 1 tablet per day for remaining 4 days. Take by mouth. Dispense:  6 Tab          Refill:  0      Plan:  See above orders  Continue albuterol  Review handouts        We have reviewed concerning signs/symptoms, normal vs abnormal progression of medical condition and when to seek immediate medical attention. Schedule with PCP or Urgent Care immediately for worsening or new symptoms. See your PCP if there is not at least some improvement in symptoms within the next 1 week  You should see your PCP for updates on your routine health maintenance.              Procedures

## 2021-09-10 ENCOUNTER — APPOINTMENT (OUTPATIENT)
Dept: GENERAL RADIOLOGY | Age: 21
End: 2021-09-10
Attending: EMERGENCY MEDICINE
Payer: MEDICAID

## 2021-09-10 ENCOUNTER — HOSPITAL ENCOUNTER (EMERGENCY)
Age: 21
Discharge: HOME | End: 2021-09-10
Attending: EMERGENCY MEDICINE
Payer: MEDICAID

## 2021-09-10 VITALS
DIASTOLIC BLOOD PRESSURE: 70 MMHG | OXYGEN SATURATION: 96 % | RESPIRATION RATE: 16 BRPM | WEIGHT: 157 LBS | TEMPERATURE: 98.4 F | HEART RATE: 92 BPM | HEIGHT: 72 IN | BODY MASS INDEX: 21.26 KG/M2 | SYSTOLIC BLOOD PRESSURE: 127 MMHG

## 2021-09-10 DIAGNOSIS — Z20.822 PERSON UNDER INVESTIGATION FOR COVID-19: ICD-10-CM

## 2021-09-10 DIAGNOSIS — R05.9 COUGH: Primary | ICD-10-CM

## 2021-09-10 PROCEDURE — 71045 X-RAY EXAM CHEST 1 VIEW: CPT

## 2021-09-10 PROCEDURE — 99282 EMERGENCY DEPT VISIT SF MDM: CPT

## 2021-09-10 RX ORDER — HYDROCODONE POLISTIREX AND CHLORPHENIRAMINE POLISTIREX 10; 8 MG/5ML; MG/5ML
5 SUSPENSION, EXTENDED RELEASE ORAL
Qty: 30 ML | Refills: 0 | Status: SHIPPED | OUTPATIENT
Start: 2021-09-10 | End: 2021-09-13

## 2021-09-10 RX ORDER — HYDROCODONE BITARTRATE AND HOMATROPINE METHYLBROMIDE 1.5; 5 MG/5ML; MG/5ML
5 SYRUP ORAL
Qty: 30 ML | Refills: 0 | Status: SHIPPED | OUTPATIENT
Start: 2021-09-10 | End: 2021-09-13

## 2021-09-10 NOTE — ED NOTES
Sitting quietly on stretcher in NAD. Respirations even and unlabored. Emergency Department Nursing Plan of Care       The Nursing Plan of Care is developed from the Nursing assessment and Emergency Department Attending provider initial evaluation. The plan of care may be reviewed in the ED Provider note.     The Plan of Care was developed with the following considerations:   Patient / Family readiness to learn indicated by:verbalized understanding  Persons(s) to be included in education: patient  Barriers to Learning/Limitations:No    Signed     Sakina Bigelow, formerly [facility]    9/10/2021   10:01 AM

## 2021-09-10 NOTE — ED NOTES
Patient  given copy of dc instructions and 1 script(s). Patient verbalized understanding of instructions and script (s). Patient given a current medication reconciliation form and verbalized understanding of their medications. Patient  verbalized understanding of the importance of discussing medications with  his or her physician or clinic they will be following up with. Patient alert and oriented and in no acute distress. Patient discharged home ambulatory .

## 2021-09-10 NOTE — ED TRIAGE NOTES
Pt reports cough x few days. Reports going to [facility] yesterday and was tested for COVID which is pending. Pt reports meds given yesterday aren't helping.

## 2021-09-10 NOTE — ED PROVIDER NOTES
EMERGENCY DEPARTMENT HISTORY AND PHYSICAL EXAM      Date: 9/10/2021  Patient Name: Chastity Huffman    History of Presenting Illness     Chief Complaint   Patient presents with    Concern For [id number] (Coronavirus)       History Provided By: Patient    HPI: Chastity Huffman, 19 y.o. male with PMHx as noted below presents the emergency department with chief complaint of cough and congestion. Patient states that symptom started 2 to 3 days ago and notes a cough and body aches. Patient was seen at an outside facility yesterday and had a Covid test sent which is currently pending. Patient states his cough is persisted despite being prescribed medications from that facility so is coming here for additional evaluation. Patient does note some discomfort with cough that is having no chest pain while he is not coughing. Denying any shortness of breath, nausea, vomiting or abdominal pain. PCP: None    Current Outpatient Medications   Medication Sig Dispense Refill    HYDROcodone-homatropine (HYCODAN) 5-1.5 mg/5 mL (5 mL) oral solution Take 5 mL by mouth three (3) times daily as needed for Cough for up to 3 days. Max Daily Amount: 15 mL. 30 mL 0    predniSONE (STERAPRED DS) 10 mg dose pack Take per dose pack instructions 21 Tab 0    azithromycin (ZITHROMAX) 250 mg tablet Take 2 tablets on day 1 then 1 tablet per day for remaining 4 days. Take by mouth. 6 Tab 0    albuterol (PROVENTIL HFA, VENTOLIN HFA, PROAIR HFA) 90 mcg/actuation inhaler Take 2 Puffs by inhalation every four (4) hours as needed for Wheezing. 1 Inhaler 0       Past History     Past Medical History:  Past Medical History:   Diagnosis Date    ADHD (attention deficit hyperactivity disorder)     Asthma        Past Surgical History:  No past surgical history on file. Family History:  No family history on file.     Social History:  Social History     Tobacco Use    Smoking status: Current Every Day Smoker    Smokeless tobacco: Never Used   Substance Use Topics    Alcohol use: No    Drug use: No       Allergies:  No Known Allergies      Review of Systems   Review of Systems  Constitutional: Negative for fever. Positive chills, and fatigue. Pulmonary: Negative shortness of breath, positive cough  Cardiovascular, negative chest pain, rotations    Physical Exam   Physical Exam    GENERAL: alert and oriented, no acute distress  EYES: PEERL, No injection, discharge or icterus. ENT: Mucous membranes pink and moist.  NECK: Supple  LUNGS: Airway patent. Non-labored respirations. Breath sounds clear  HEART: Regular rate and rhythm. ABDOMEN: Non-distended  SKIN:  warm, dry  MSK/EXTREMITIES: Without deformity  NEUROLOGICAL: Alert, oriented      Diagnostic Study Results     Labs -   No results found for this or any previous visit (from the past 12 hour(s)). Radiologic Studies -   XR CHEST PORT   Final Result   No acute abnormality identified. CT Results  (Last 48 hours)    None        CXR Results  (Last 48 hours)               09/10/21 1010  XR CHEST PORT Final result    Impression:  No acute abnormality identified. Narrative:  EXAM:  XR CHEST PORT       INDICATION:  Chest Pain       COMPARISON:  2018       FINDINGS: A portable AP radiograph of the chest was obtained at 1006 hours. .    The lungs are clear. The cardiac and mediastinal contours and pulmonary   vascularity are normal.  The bones and soft tissues are grossly within normal   limits. Medical Decision Making   I, Debbie Nagy MD am the first provider for this patient and am the attending of record for this patient encounter. I reviewed the vital signs, available nursing notes, past medical history, past surgical history, family history and social history. Vital Signs-Reviewed the patient's vital signs.   Patient Vitals for the past 12 hrs:   Temp Pulse Resp BP SpO2   09/10/21 0848 98.4 °F (36.9 °C) 92 16 127/70 96 %         Records Reviewed: Nursing Notes and Old Medical Records    Provider Notes (Medical Decision Making): On presentation, the patient is well appearing, in no acute distress with reassuring vital signs. Based on my history and exam the differential diagnosis for this patient includes COVID 19, URI, sinusitis, bronchitis. Nothing to suggest strep pharyngitis, PNA or bacterial sinusitis. X-ray showed no acute findings of interpretation. Patient may have COVID 19, however is breathing comfortably, maintaining adequate O2 sats on room air and is overall well appearing so does not require admission at this time. .  Have recommended self quarantine per CDC guidelines. Symptomatic therapy suggested. Should return immediately to the emergency department develops shortness of breath, confusion, weakness or any othe new/worrisome symptoms       ED Course:   Initial assessment performed. The patients presenting problems have been discussed, and they are in agreement with the care plan formulated and outlined with them. I have encouraged them to ask questions as they arise throughout their visit. Medications - No data to display      [address]  results have been reviewed with him. He has been counseled regarding his diagnosis. He verbally conveys understanding and agreement of the signs, symptoms, diagnosis, treatment and prognosis and additionally agrees to follow up as recommended with Dr. None in 24 - 48 hours. He also agrees with the care-plan and conveys that all of his questions have been answered. I have also put together some discharge instructions for him that include: 1) educational information regarding their diagnosis, 2) how to care for their diagnosis at home, as well a 3) list of reasons why they would want to return to the ED prior to their follow-up appointment, should their condition change. Disposition:  home    PLAN:  1.    Discharge Medication List as of 9/10/2021 10:21 AM      START taking these medications    Details   HYDROcodone-homatropine (HYCODAN) 5-1.5 mg/5 mL (5 mL) oral solution Take 5 mL by mouth three (3) times daily as needed for Cough for up to 3 days. Max Daily Amount: 15 mL., Normal, Disp-30 mL, R-0         CONTINUE these medications which have NOT CHANGED    Details   predniSONE (STERAPRED DS) 10 mg dose pack Take per dose pack instructions, Normal, Disp-21 Tab, R-0      azithromycin (ZITHROMAX) 250 mg tablet Take 2 tablets on day 1 then 1 tablet per day for remaining 4 days. Take by mouth., Normal, Disp-6 Tab, R-0      albuterol (PROVENTIL HFA, VENTOLIN HFA, PROAIR HFA) 90 mcg/actuation inhaler Take 2 Puffs by inhalation every four (4) hours as needed for Wheezing., Normal, Disp-1 Inhaler, R-0           2. Follow-up Information     Follow up With Specialties Details Why [facility] EMERGENCY DEPT Emergency Medicine  If symptoms worsen Lazara Burk        Return to ED if worse     Diagnosis     Clinical Impression:   1. Cough    2. Person under investigation for COVID-19        Please note that this dictation was completed with Dragon, computer voice recognition software. Quite often unanticipated grammatical, syntax, homophones, and other interpretive errors are inadvertently transcribed by the computer software. Please disregard these errors. Additionally, please excuse any errors that have escaped final proofreading.

## 2021-10-14 ENCOUNTER — HOSPITAL ENCOUNTER (INPATIENT)
Age: 21
LOS: 1 days | Discharge: HOME | DRG: 754 | End: 2021-10-14
Attending: EMERGENCY MEDICINE | Admitting: PSYCHIATRY & NEUROLOGY
Payer: MEDICAID

## 2021-10-14 VITALS
WEIGHT: 157 LBS | RESPIRATION RATE: 16 BRPM | HEIGHT: 72 IN | SYSTOLIC BLOOD PRESSURE: 135 MMHG | DIASTOLIC BLOOD PRESSURE: 85 MMHG | BODY MASS INDEX: 21.26 KG/M2 | OXYGEN SATURATION: 99 % | HEART RATE: 79 BPM | TEMPERATURE: 98.6 F

## 2021-10-14 DIAGNOSIS — R45.851 SUICIDAL IDEATION: Primary | ICD-10-CM

## 2021-10-14 PROBLEM — F39 UNSPECIFIED MOOD (AFFECTIVE) DISORDER (HCC): Status: ACTIVE | Noted: 2021-10-14

## 2021-10-14 LAB
ALBUMIN SERPL-MCNC: 4.2 G/DL (ref 3.5–5)
ALBUMIN/GLOB SERPL: 1.1 {RATIO} (ref 1.1–2.2)
ALP SERPL-CCNC: 74 U/L (ref 45–117)
ALT SERPL-CCNC: 16 U/L (ref 12–78)
AMPHET UR QL SCN: NEGATIVE
ANION GAP SERPL CALC-SCNC: 4 MMOL/L (ref 5–15)
APAP SERPL-MCNC: <2 UG/ML (ref 10–30)
APPEARANCE UR: CLEAR
AST SERPL-CCNC: 19 U/L (ref 15–37)
BACTERIA URNS QL MICRO: NEGATIVE /HPF
BARBITURATES UR QL SCN: NEGATIVE
BASOPHILS # BLD: 0 K/UL (ref 0–0.1)
BASOPHILS NFR BLD: 0 % (ref 0–1)
BENZODIAZ UR QL: NEGATIVE
BILIRUB SERPL-MCNC: 0.4 MG/DL (ref 0.2–1)
BILIRUB UR QL: NEGATIVE
BUN SERPL-MCNC: 13 MG/DL (ref 6–20)
BUN/CREAT SERPL: 13 (ref 12–20)
CALCIUM SERPL-MCNC: 9.6 MG/DL (ref 8.5–10.1)
CANNABINOIDS UR QL SCN: POSITIVE
CHLORIDE SERPL-SCNC: 105 MMOL/L (ref 97–108)
CO2 SERPL-SCNC: 27 MMOL/L (ref 21–32)
COCAINE UR QL SCN: NEGATIVE
COLOR UR: NORMAL
COMMENT, HOLDF: NORMAL
CREAT SERPL-MCNC: 0.99 MG/DL (ref 0.7–1.3)
DIFFERENTIAL METHOD BLD: NORMAL
DRUG SCRN COMMENT,DRGCM: ABNORMAL
EOSINOPHIL # BLD: 0.3 K/UL (ref 0–0.4)
EOSINOPHIL NFR BLD: 4 % (ref 0–7)
EPITH CASTS URNS QL MICRO: NORMAL /LPF
ERYTHROCYTE [DISTWIDTH] IN BLOOD BY AUTOMATED COUNT: 12.6 % (ref 11.5–14.5)
ETHANOL SERPL-MCNC: <10 MG/DL
FLUAV RNA SPEC QL NAA+PROBE: NOT DETECTED
FLUBV RNA SPEC QL NAA+PROBE: NOT DETECTED
GLOBULIN SER CALC-MCNC: 3.7 G/DL (ref 2–4)
GLUCOSE SERPL-MCNC: 94 MG/DL (ref 65–100)
GLUCOSE UR STRIP.AUTO-MCNC: NEGATIVE MG/DL
HCT VFR BLD AUTO: 41.3 % (ref 36.6–50.3)
HGB BLD-MCNC: 13.9 G/DL (ref 12.1–17)
HGB UR QL STRIP: NEGATIVE
IMM GRANULOCYTES # BLD AUTO: 0 K/UL (ref 0–0.04)
IMM GRANULOCYTES NFR BLD AUTO: 0 % (ref 0–0.5)
KETONES UR QL STRIP.AUTO: NEGATIVE MG/DL
LEUKOCYTE ESTERASE UR QL STRIP.AUTO: NEGATIVE
LYMPHOCYTES # BLD: 2.8 K/UL (ref 0.8–3.5)
LYMPHOCYTES NFR BLD: 36 % (ref 12–49)
MAGNESIUM SERPL-MCNC: 2.3 MG/DL (ref 1.6–2.4)
MCH RBC QN AUTO: 30.6 PG (ref 26–34)
MCHC RBC AUTO-ENTMCNC: 33.7 G/DL (ref 30–36.5)
MCV RBC AUTO: 91 FL (ref 80–99)
METHADONE UR QL: NEGATIVE
MONOCYTES # BLD: 0.8 K/UL (ref 0–1)
MONOCYTES NFR BLD: 10 % (ref 5–13)
NEUTS SEG # BLD: 3.9 K/UL (ref 1.8–8)
NEUTS SEG NFR BLD: 50 % (ref 32–75)
NITRITE UR QL STRIP.AUTO: NEGATIVE
NRBC # BLD: 0 K/UL (ref 0–0.01)
NRBC BLD-RTO: 0 PER 100 WBC
OPIATES UR QL: NEGATIVE
PCP UR QL: NEGATIVE
PH UR STRIP: 7 [PH] (ref 5–8)
PLATELET # BLD AUTO: 253 K/UL (ref 150–400)
PMV BLD AUTO: 9.7 FL (ref 8.9–12.9)
POTASSIUM SERPL-SCNC: 3.5 MMOL/L (ref 3.5–5.1)
PROT SERPL-MCNC: 7.9 G/DL (ref 6.4–8.2)
PROT UR STRIP-MCNC: NEGATIVE MG/DL
RBC # BLD AUTO: 4.54 M/UL (ref 4.1–5.7)
RBC #/AREA URNS HPF: NORMAL /HPF (ref 0–5)
SALICYLATES SERPL-MCNC: <1.7 MG/DL (ref 2.8–20)
SAMPLES BEING HELD,HOLD: NORMAL
SARS-COV-2, COV2: NOT DETECTED
SODIUM SERPL-SCNC: 136 MMOL/L (ref 136–145)
SP GR UR REFRACTOMETRY: 1.01 (ref 1–1.03)
UR CULT HOLD, URHOLD: NORMAL
UROBILINOGEN UR QL STRIP.AUTO: 0.2 EU/DL (ref 0.2–1)
WBC # BLD AUTO: 7.8 K/UL (ref 4.1–11.1)
WBC URNS QL MICRO: NORMAL /HPF (ref 0–4)

## 2021-10-14 PROCEDURE — 80179 DRUG ASSAY SALICYLATE: CPT

## 2021-10-14 PROCEDURE — 80143 DRUG ASSAY ACETAMINOPHEN: CPT

## 2021-10-14 PROCEDURE — 65220000003 HC RM SEMIPRIVATE PSYCH

## 2021-10-14 PROCEDURE — 99285 EMERGENCY DEPT VISIT HI MDM: CPT

## 2021-10-14 PROCEDURE — 80053 COMPREHEN METABOLIC PANEL: CPT

## 2021-10-14 PROCEDURE — 83735 ASSAY OF MAGNESIUM: CPT

## 2021-10-14 PROCEDURE — 87636 SARSCOV2 & INF A&B AMP PRB: CPT

## 2021-10-14 PROCEDURE — 85025 COMPLETE CBC W/AUTO DIFF WBC: CPT

## 2021-10-14 PROCEDURE — 36415 COLL VENOUS BLD VENIPUNCTURE: CPT

## 2021-10-14 PROCEDURE — 80307 DRUG TEST PRSMV CHEM ANLYZR: CPT

## 2021-10-14 PROCEDURE — 81001 URINALYSIS AUTO W/SCOPE: CPT

## 2021-10-14 PROCEDURE — 82077 ASSAY SPEC XCP UR&BREATH IA: CPT

## 2021-10-14 RX ORDER — LORAZEPAM 2 MG/ML
1 INJECTION INTRAMUSCULAR
Status: DISCONTINUED | OUTPATIENT
Start: 2021-10-14 | End: 2021-10-14 | Stop reason: HOSPADM

## 2021-10-14 RX ORDER — OLANZAPINE 5 MG/1
5 TABLET ORAL
Status: DISCONTINUED | OUTPATIENT
Start: 2021-10-14 | End: 2021-10-14 | Stop reason: HOSPADM

## 2021-10-14 RX ORDER — ADHESIVE BANDAGE
30 BANDAGE TOPICAL DAILY PRN
Status: DISCONTINUED | OUTPATIENT
Start: 2021-10-14 | End: 2021-10-14 | Stop reason: HOSPADM

## 2021-10-14 RX ORDER — ACETAMINOPHEN 325 MG/1
650 TABLET ORAL
Status: DISCONTINUED | OUTPATIENT
Start: 2021-10-14 | End: 2021-10-14 | Stop reason: HOSPADM

## 2021-10-14 RX ORDER — HALOPERIDOL 5 MG/ML
5 INJECTION INTRAMUSCULAR
Status: DISCONTINUED | OUTPATIENT
Start: 2021-10-14 | End: 2021-10-14 | Stop reason: HOSPADM

## 2021-10-14 RX ORDER — BENZTROPINE MESYLATE 1 MG/1
1 TABLET ORAL
Status: DISCONTINUED | OUTPATIENT
Start: 2021-10-14 | End: 2021-10-14 | Stop reason: HOSPADM

## 2021-10-14 RX ORDER — TRAZODONE HYDROCHLORIDE 50 MG/1
50 TABLET ORAL
Status: DISCONTINUED | OUTPATIENT
Start: 2021-10-14 | End: 2021-10-14 | Stop reason: HOSPADM

## 2021-10-14 RX ORDER — HYDROXYZINE 50 MG/1
50 TABLET, FILM COATED ORAL
Status: DISCONTINUED | OUTPATIENT
Start: 2021-10-14 | End: 2021-10-14 | Stop reason: HOSPADM

## 2021-10-14 RX ORDER — DIPHENHYDRAMINE HYDROCHLORIDE 50 MG/ML
50 INJECTION, SOLUTION INTRAMUSCULAR; INTRAVENOUS
Status: DISCONTINUED | OUTPATIENT
Start: 2021-10-14 | End: 2021-10-14 | Stop reason: HOSPADM

## 2021-10-14 NOTE — SUICIDE SAFETY PLAN
SAFETY PLAN    A suicide Safety Plan is a document that supports someone when they are having thoughts of suicide. Warning Signs that indicate a suicidal crisis may be developing: What (situations, thoughts, feelings, body sensations, behaviors, etc.) do you experience that lets you know you are beginning to think about suicide? 1. Feeling intense loneliness  2. Feeling overwhelmed with stress  3. Suicidal thoughts    Internal Coping Strategies:  What things can I do (relaxation techniques, hobbies, physical activities, etc.) to take my mind off my problems without contacting another person? 1. breathing  2. work  3. music    People and social settings that provide distraction: Who can I call or where can I go to distract me? 1. Name: Brother  Phone: in phone  2. Name: Girlfriend  Phone: in phone   3. Place: work            4. Place: brother's house    People whom I can ask for help: Who can I call when I need help - for example, friends, family, clergy, someone else? 1. Name: Brother                Phone: in phone  2. Name: Girlfriend  Phone: in phone      Professionals or [address] [place] [address] I can contact during a crisis: Who can I call for help - for example, my doctor, my psychiatrist, my psychologist, a mental health provider, a suicide hotline? 1. Clinician Name: [address] [place] [address]    Phone: [phone]        3. Suicide Prevention Lifeline: 3-583-664-TALK (8433)    4. [address] [place] [address] Emergency Services -  for example, [facility] suicide hotline, [facility] Hotline: 461       Making the environment safe: How can I make my environment (house/apartment/living space) safer? For example, can I remove guns, medications, and other items?   1. Program [facility] into your phone

## 2021-10-14 NOTE — BH NOTES
GROUP THERAPY PROGRESS NOTE    Patient did not participate in psychotherapy group.     Lisa Verde MSW, [facility]

## 2021-10-14 NOTE — BH NOTES
Behavioral Health Transition Record to Provider    Patient Name: Fannie Morales  YOB: 2000  Medical Record Number: [id number]  Date of Admission: 10/14/2021  Date of Discharge: 10/14/2021     Attending Provider: James Raines MD  Discharging Provider: Guillermo Cuello   To contact this individual call [phone] and ask the  to page. If unavailable, ask to be transferred to [address] [place] [address] Provider on call. [facility] Provider will be available on call 24/7 and during holidays. Primary Care Provider: None    No Known Allergies    Reason for Admission: 29-year-old male presents with a chief complaint of suicidal ideation. Patient states he is under quite a bit of stress recently. He had to move out of his apartment today. He reports not having anywhere to go. He states that he wanted to jump off a bridge and actually went to the bridge but could not bring himself to do it. He denies any other symptoms.       Admission Diagnosis: Unspecified mood (affective) disorder ([facility] [address] [F39]    * No surgery found *    Results for orders placed or performed during the hospital encounter of 10/14/21   URINE CULTURE HOLD SAMPLE    Specimen: Serum; Urine   Result Value Ref Range    Urine culture hold        Urine on hold in Microbiology dept for 2 days. If unpreserved urine is submitted, it cannot be used for addtional testing after 24 hours, recollection will be required.    CBC WITH AUTOMATED DIFF   Result Value Ref Range    WBC 7.8 4.1 - 11.1 K/uL    RBC 4.54 4.10 - 5.70 M/uL    HGB 13.9 12.1 - 17.0 g/dL    HCT 41.3 36.6 - 50.3 %    MCV 91.0 80.0 - 99.0 FL    MCH 30.6 26.0 - 34.0 PG    MCHC 33.7 30.0 - 36.5 g/dL    RDW 12.6 11.5 - 14.5 %    PLATELET 467 865 - 597 K/uL    MPV 9.7 8.9 - 12.9 FL    NRBC 0.0 0  WBC    ABSOLUTE NRBC 0.00 0.00 - 0.01 K/uL    NEUTROPHILS 50 32 - 75 %    LYMPHOCYTES 36 12 - 49 %    MONOCYTES 10 5 - 13 %    EOSINOPHILS 4 0 - 7 %    BASOPHILS 0 0 - 1 %    IMMATURE GRANULOCYTES 0 0.0 - 0.5 %    ABS. NEUTROPHILS 3.9 1.8 - 8.0 K/UL    ABS. LYMPHOCYTES 2.8 0.8 - 3.5 K/UL    ABS. MONOCYTES 0.8 0.0 - 1.0 K/UL    ABS. EOSINOPHILS 0.3 0.0 - 0.4 K/UL    ABS. BASOPHILS 0.0 0.0 - 0.1 K/UL    ABS. IMM. GRANS. 0.0 0.00 - 0.04 K/UL    DF AUTOMATED     METABOLIC PANEL, COMPREHENSIVE   Result Value Ref Range    Sodium 136 136 - 145 mmol/L    Potassium 3.5 3.5 - 5.1 mmol/L    Chloride 105 97 - 108 mmol/L    CO2 27 21 - 32 mmol/L    Anion gap 4 (L) 5 - 15 mmol/L    Glucose 94 65 - 100 mg/dL    BUN 13 6 - 20 MG/DL    Creatinine 0.99 0.70 - 1.30 MG/DL    BUN/Creatinine ratio 13 12 - 20      GFR est AA >60 >60 ml/min/1.73m2    GFR est non-AA >60 >60 ml/min/1.73m2    Calcium 9.6 8.5 - 10.1 MG/DL    Bilirubin, total 0.4 0.2 - 1.0 MG/DL    ALT (SGPT) 16 12 - 78 U/L    AST (SGOT) 19 15 - 37 U/L    Alk.  phosphatase 74 45 - 117 U/L    Protein, total 7.9 6.4 - 8.2 g/dL    Albumin 4.2 3.5 - 5.0 g/dL    Globulin 3.7 2.0 - 4.0 g/dL    A-G Ratio 1.1 1.1 - 2.2     ETHYL ALCOHOL   Result Value Ref Range    ALCOHOL(ETHYL),SERUM <10 <10 MG/DL   ACETAMINOPHEN   Result Value Ref Range    Acetaminophen level <2 (L) 10 - 30 ug/mL   SALICYLATE   Result Value Ref Range    Salicylate level <8.7 (L) 2.8 - 20.0 MG/DL   MAGNESIUM   Result Value Ref Range    Magnesium 2.3 1.6 - 2.4 mg/dL   DRUG SCREEN, URINE   Result Value Ref Range    AMPHETAMINES Negative NEG      BARBITURATES Negative NEG      BENZODIAZEPINES Negative NEG      COCAINE Negative NEG      METHADONE Negative NEG      OPIATES Negative NEG      PCP(PHENCYCLIDINE) Negative NEG      THC (TH-CANNABINOL) Positive (A) NEG      Drug screen comment (NOTE)    URINALYSIS W/MICROSCOPIC   Result Value Ref Range    Color YELLOW/STRAW      Appearance CLEAR CLEAR      Specific gravity 1.010 1.003 - 1.030      pH (UA) 7.0 5.0 - 8.0      Protein Negative NEG mg/dL    Glucose Negative NEG mg/dL    Ketone Negative NEG mg/dL    Bilirubin Negative NEG      Blood Negative NEG Urobilinogen 0.2 0.2 - 1.0 EU/dL    Nitrites Negative NEG      Leukocyte Esterase Negative NEG      WBC 0-4 0 - 4 /hpf    RBC 0-5 0 - 5 /hpf    Epithelial cells FEW FEW /lpf    Bacteria Negative NEG /hpf   SAMPLES BEING HELD   Result Value Ref Range    SAMPLES BEING HELD 1DARK GREEN     COMMENT        Add-on orders for these samples will be processed based on acceptable specimen integrity and analyte stability, which may vary by analyte. COVID-19 WITH INFLUENZA A/B   Result Value Ref Range    SARS-CoV-2 Not detected NOTD      Influenza A by PCR Not detected NOTD      Influenza B by PCR Not detected NOTD         Immunizations administered during this encounter:   Immunization History   Administered Date(s) Administered    DTAP Vaccine 2000, 04/07/1999, 12/01/1999, 01/22/2001, 07/16/2002    HIB Vaccine 2000, 12/01/1999    Hepatitis B Vaccine 2000, 2000, 12/01/1999    IPV 1998, 04/07/1999, 12/01/1999, 07/16/2002    Influenza Vaccine Nasal 12/06/2011    Influenza Vaccine Split 01/03/2008    MMR Vaccine 12/01/1999, 07/16/2002    Meningococcal Vaccine 12/06/2011    TDAP Vaccine 08/17/2009    Varicella Virus Vaccine Live 07/06/2001, 06/03/2008       Screening for Metabolic Disorders for Patients on Antipsychotic Medications  (Data obtained from the EMR)    Estimated Body Mass Index  Estimated body mass index is 21.29 kg/m² as calculated from the following:    Height as of this encounter: 6' (1.829 m). Weight as of this encounter: 71.2 kg (157 lb).      Vital Signs/Blood Pressure  Visit Vitals  /85 (BP 1 Location: Left upper arm, BP Patient Position: Sitting)   Pulse 79   Temp 98.6 °F (37 °C)   Resp 16   Ht 6' (1.829 m)   Wt 71.2 kg (157 lb)   SpO2 99%   BMI 21.29 kg/m²       Blood Glucose/Hemoglobin A1c  Lab Results   Component Value Date/Time    Glucose 94 10/14/2021 02:23 AM       No results found for: HBA1C, [id number]     Lipid Panel  No results found for: CHOL, CHOLX, CHLST, CHOLV, [id number], HDL, HDLP, LDL, LDLC, DLDLP, TGLX, TRIGL, TRIGP, CHHD, CHHDX     Discharge Diagnosis: depression     Discharge Plan: discharge in care of his brother     Discharge Medication List and Instructions:   Current Discharge Medication List          Unresulted Labs (24h ago, onward)     Start     Ordered    10/15/21 0600  TSH, 3RD GENERATION - DO NOT ORDER IF PATIENT HAS RECEIVED THIS LAB WITHIN THE LAST 8 WEEKS  ONE TIME,   R     Comments: Do not repeat lab if already done in the ED prior to arrival on unit. 10/14/21 0714    10/15/21 0600  LIPID PANEL - DO NOT ORDER IF PATIENT HAS RECEIVED THIS LAB WITHIN THE LAST 8 WEEKS  ONE TIME,   R     Comments: Patient should be fasting prior to sample being obtained. 10/14/21 0714              To obtain results of studies pending at discharge, please contact [phone]    Follow-up Information     Follow up With Specialties Details Why [facility]  On 10/14/2021 please call for same day access  [address] [place] [id number]  Raina [address]  On 10/14/2021 please call [phone] for same day access [facility]   [phone]          Advanced Directive:   Does the patient have an appointed surrogate decision maker? No  Does the patient have a Medical Advance Directive? No  Does the patient have a Psychiatric Advance Directive? No  If the patient does not have a surrogate or Medical Advance Directive AND Psychiatric Advance Directive, the patient was offered information on these advance directives Patient declined to complete    Patient Instructions: Please continue all medications until otherwise directed by physician. Tobacco Cessation Discharge Plan:   Is the patient a smoker and needs referral for smoking cessation? No  Patient referred to the following for smoking cessation with an appointment?  No     Patient was offered medication to assist with smoking cessation at discharge? No  Was education for smoking cessation added to the discharge instructions? Yes    Alcohol/Substance Abuse Discharge Plan:   Does the patient have a history of substance/alcohol abuse and requires a referral for treatment? No  Patient referred to the following for substance/alcohol abuse treatment with an appointment? No  Patient was offered medication to assist with alcohol cessation at discharge? No  Was education for substance/alcohol abuse added to discharge instructions? no    Patient discharged to Home; discussed with patient/caregiver and provided to the patient/caregiver either in hard copy or electronically.

## 2021-10-14 NOTE — BSMART NOTE
Patient has been accepted to [facility] bed/room 727/02 by Marlena Ashton NP for Dr. Deo Dimas. ADAIR Romero informed to give report to [phone].      NIC [place], Supervisee in Social Work

## 2021-10-14 NOTE — ED NOTES
Pt arrives ambulatory via squad w c/c of  with plan. EMS reports pt was having problems with his girlfriend and he called her to let her know he was going to jump off a bridge. Pt's girlfriend called and passed along this info to 911. EMS reports pt may have been kicked out of his apartment tonight.   Pt reports history of asthma and last month tested positive for COVID-19

## 2021-10-14 NOTE — BH NOTES
GROUP THERAPY PROGRESS NOTE    Patient did not participate in healthy living and wellness education group.     NIC Truong, Supervisee in Social Work

## 2021-10-14 NOTE — PROGRESS NOTES
Problem: Depressed Mood (Adult/Pediatric)  Goal: *STG: Participates in treatment plan  Outcome: Progressing Towards Goal  Note: Out on unit with sad to anxious. Denies SI, no self harming behaviors and or urges to harm self. Future focused and actively talking to friends about events such as work and problem solving his housing stressors. Demonstrates ability to follow staff direction, unit routine and interact w peer appropriately. Daily goal is to d/c home and talk w tx team about length of stay. Staff focus is on offering support and unit routine education. Request to d/c home. Per tx team will call mother to confirm housing option and will update tx team with d/c plan. 1301: spoke with oldest brother on phone.  States pt can stay with him and will pick him up from hospital. Safety plan reviewed, both verbalized understanding  Goal: *STG: Demonstrates reduction in symptoms and increase in insight into coping skills/future focused  Outcome: Progressing Towards Goal  Goal: Interventions  Outcome: José Chua Treatment Plan for Fernando Candelaria Treatment Plan Initiated: 10/14/21    Treatment Plan Modalities:  Type of Modality Amount  (x minutes) Frequency (x/week) Duration (x days) Name of Responsible Staff   [address] [facility] meetings to encourage peer interactions 15 7 1 Roxy SHANKAR     Group psychotherapy to assist in building coping skills and internal controls 60 7 1 Tessie Green   Therapeutic activity groups to build coping skills 60 7 1 Tessie Green   Psychoeducation in group setting to address:   Medication education   [address] PharmD   Coping skills   30 3 1 Tessie Green   Relaxation techniques         Symptom management         Discharge planning   60 2 1 Tessie Green   Spirituality    60 2 1 Chaplain IBANEZ   61 1 1 volunteer   Recovery/AA/NA      volunteer   Physician medication management   15 7 1 Dr. Scarlett Zuniga meeting/discharge planning   15 2 1 Emilia Cole and Tessie Leslie

## 2021-10-14 NOTE — PROGRESS NOTES
Problem: Depressed Mood (Adult/Pediatric)  Goal: *STG: Participates in treatment plan  10/14/2021 1302 by Wing Che RN  Outcome: Resolved/Met  10/14/2021 1144 by Wing Che RN  Outcome: Progressing Towards Goal  Note: Out on unit with sad to anxious. Denies SI, no self harming behaviors and or urges to harm self. Future focused and actively talking to friends about events such as work and problem solving his housing stressors. Demonstrates ability to follow staff direction, unit routine and interact w peer appropriately. Daily goal is to d/c home and talk w tx team about length of stay.  Staff focus is on offering support and unit routine education  Goal: *STG: Verbalizes anger, guilt, and other feelings in a constructive manor  Outcome: Resolved/Met  Goal: *STG: Attends activities and groups  Outcome: Resolved/Met  Goal: *STG: Demonstrates reduction in symptoms and increase in insight into coping skills/future focused  10/14/2021 1302 by Wing Che RN  Outcome: Resolved/Met  10/14/2021 1144 by Wing Che RN  Outcome: Progressing Towards Goal  Goal: Interventions  10/14/2021 1302 by Wing Che RN  Outcome: Resolved/Met  10/14/2021 1144 by Wing Che RN  Outcome: Progressing Towards Goal

## 2021-10-14 NOTE — ED NOTES
Pt is in bed, resting. Lights are turned off and door open. Pt has no needs or wants as of now.  Will continue to monitor

## 2021-10-14 NOTE — PROGRESS NOTES
TRANSFER - IN REPORT:    Verbal report received from Heather (name) on Vj Castellanos  being received from [facility] [place] ED (unit) for routine progression of care      Report consisted of patients Situation, Background, Assessment and   Recommendations(SBAR). Information from the following report(s) SBAR, ED Summary, [facility] and Recent Results was reviewed with the receiving nurse. Opportunity for questions and clarification was provided. Assessment completed upon patients arrival to unit and care assumed.

## 2021-10-14 NOTE — PROGRESS NOTES
At approximately 0645 the p/t arrived to be received on the unit. The p/t signed a voluntary consent and was walked on the unit. Once on the unit the patient requested to leave AMA because they believed they were not told enough information before agreeing to come onto the unit. Adrian Physician was called and Los jeri NP stated to the RN that the patient needed to be assessed. [facility] line was called and the p/t spoke to them on the phone. They then relayed to the RN that a TDO assessment will take place at 0830. P/t made aware, p/t declined skin assessment, emptied pockets, removed hair band, and handed over lighter.

## 2021-10-14 NOTE — ED PROVIDER NOTES
77-year-old male presents with a chief complaint of suicidal ideation. Patient states he is under quite a bit of stress recently. He had to move out of his apartment today. He reports not having anywhere to go. He states that he wanted to jump off a bridge and actually went to the bridge but could not bring himself to do it. He denies any other symptoms. Past Medical History:   Diagnosis Date    ADHD (attention deficit hyperactivity disorder)     Asthma        No past surgical history on file. No family history on file. Social History     Socioeconomic History    Marital status: SINGLE     Spouse name: Not on file    Number of children: Not on file    Years of education: Not on file    Highest education level: Not on file   Occupational History    Not on file   Tobacco Use    Smoking status: Current Every Day Smoker    Smokeless tobacco: Never Used   Substance and Sexual Activity    Alcohol use: No    Drug use: No    Sexual activity: Not on file   Other Topics Concern    Not on file   Social History Narrative    Not on file     Social Determinants of Health     Financial Resource Strain:     Difficulty of Paying Living Expenses:    Food Insecurity:     Worried About Running Out of Food in the Last Year:     [address] in the Last Year:    Transportation Needs:     Lack of Transportation (Medical):      Lack of Transportation (Non-Medical):    Physical Activity:     Days of Exercise per Week:     Minutes of Exercise per Session:    Stress:     Feeling of Stress :    Social Connections:     Frequency of Communication with Friends and Family:     Frequency of Social Gatherings with Friends and Family:     Attends Bahai Services:     Active Member of Clubs or Organizations:     Attends Club or Organization Meetings:     Marital Status:    Intimate Partner Violence:     Fear of Current or Ex-Partner:     Emotionally Abused:     Physically Abused:     Sexually Abused: ALLERGIES: Patient has no known allergies. Review of Systems   Constitutional: Negative for fever. HENT: Negative for rhinorrhea. Respiratory: Negative for cough. Cardiovascular: Negative for chest pain. Gastrointestinal: Negative for abdominal pain. Genitourinary: Negative for dysuria. Musculoskeletal: Negative for back pain. Skin: Negative for wound. Neurological: Negative for headaches. Psychiatric/Behavioral: Positive for suicidal ideas. Negative for confusion. There were no vitals filed for this visit. Physical Exam  Vitals and nursing note reviewed. Constitutional:       General: He is not in acute distress. Appearance: Normal appearance. He is not ill-appearing, toxic-appearing or diaphoretic. HENT:      Head: Normocephalic. Eyes:      Extraocular Movements: Extraocular movements intact. Cardiovascular:      Rate and Rhythm: Normal rate. Pulses: Normal pulses. Pulmonary:      Effort: Pulmonary effort is normal. No respiratory distress. Abdominal:      General: There is no distension. Musculoskeletal:         General: Normal range of motion. Cervical back: Normal range of motion. Skin:     General: Skin is dry. Capillary Refill: Capillary refill takes less than 2 seconds. Neurological:      Mental Status: He is alert and oriented to person, place, and time. Psychiatric:         Thought Content: Thought content includes suicidal ideation. Thought content includes suicidal plan. MDM  Number of Diagnoses or Management Options  Suicidal ideation  Diagnosis management comments: 40-year-old male presents with suicidal ideation with a plan. Labs unremarkable. [facility] evaluated the patient and he agreed to voluntary admission.     Total critical care time spent exclusive of procedures:  37 minutes         Amount and/or Complexity of Data Reviewed  Clinical lab tests: ordered and reviewed Procedures

## 2021-10-14 NOTE — BH NOTES
PSYCHOSOCIAL ASSESSMENT  :Patient identifying info:   Heidy Howard is a 19 y.o., male admitted 10/14/2021  1:58 AM     Presenting problem and precipitating factors:   He was admitted due to suicidal ideations. He requested to be discharge, denies SI, nursing spoke to his brother who is supportive and willing to pickup patient and have patient stay with him    Mental status assessment:   Alert , oriented x's 3, pleasant and compliant with treatment     Strengths:  Employed   Family support     Collateral information:     Current psychiatric /substance abuse providers and contact info:   No current provider     Previous psychiatric/substance abuse providers and response to treatment:     Family history of mental illness or substance abuse: none noted     Substance abuse history:    Social History     Tobacco Use    Smoking status: Current Every Day Smoker    Smokeless tobacco: Never Used   Substance Use Topics    Alcohol use: No       History of biomedical complications associated with substance abuse :  None noted     Patient's current acceptance of treatment or motivation for change:he was admitted voluntarily     Family constellation:   Single   No children     Is significant other involved?        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

## 2021-10-14 NOTE — H&P
[address] [facility] HISTORY AND PHYSICAL    Name:  Ezra South  MR#:  [id number]  :  2000  ACCOUNT #:  [de-identified]  ADMIT DATE:  10/14/2021      INITIAL PSYCHIATRIC INTERVIEW    CHIEF COMPLAINT:  \"I feel much better today. \"    HISTORY OF PRESENT ILLNESS:  The patient is a 57-year-old [facility] American man who is currently admitted after he presented to the ER yesterday requesting help for feeling depressed and hopeless and having thoughts of suicide. He had reported that he walked to the nearest bridge with a thought of wanting to end his life, but he spoke to his girlfriend on the phone and she managed to persuade him to come to the hospital.  Reports that him and his girlfriend have been having some conflict recently and she told him that she did not love him in a state of anger. Reports that he felt hopeless after this and thought about suicide. Denies that he had any serious intent to do that. Denies depressed mood recently other than yesterday and denies any neurovegetative symptoms of depression. States that he smokes marijuana once or twice a week but has not smoked it in 2 weeks. He reports sleeping well and his energy and mood have been fair. Denies any perceptual abnormalities. Denies any delusions. Since his arrival on the unit, he has been calm and cooperative. He requests to be discharged home and says he feels safe going home and does not want to be on any psychotropic medications. Contact was made with his siblings who agreed that he would come and live with them because he has lost his housing and they felt that he was safe to come back. He was encouraged to stay but does not want to do so and he was discharged against medical advice.     PAST MEDICAL HISTORY:  Reviewed as per the history and physical exam.      Past Medical History:   Diagnosis Date    ADHD (attention deficit hyperactivity disorder)     Asthma      Prior to Admission medications Medication Sig Start Date End Date Taking? Authorizing Provider   albuterol (PROVENTIL HFA, VENTOLIN HFA, PROAIR HFA) 90 mcg/actuation inhaler Take 2 Puffs by inhalation every four (4) hours as needed for Wheezing. Patient not taking: Reported on 10/14/2021 12/12/18   JOANNE Brito     Vitals:    10/14/21 0222   BP: 135/85   Pulse: 79   Resp: 16   Temp: 98.6 °F (37 °C)   SpO2: 99%   Weight: 71.2 kg (157 lb)   Height: 6' (1.829 m)     Lab Results   Component Value Date/Time    WBC 7.8 10/14/2021 02:23 AM    HGB 13.9 10/14/2021 02:23 AM    HCT 41.3 10/14/2021 02:23 AM    PLATELET 266 32/46/2645 02:23 AM    MCV 91.0 10/14/2021 02:23 AM     Lab Results   Component Value Date/Time    Sodium 136 10/14/2021 02:23 AM    Potassium 3.5 10/14/2021 02:23 AM    Chloride 105 10/14/2021 02:23 AM    CO2 27 10/14/2021 02:23 AM    Anion gap 4 (L) 10/14/2021 02:23 AM    Glucose 94 10/14/2021 02:23 AM    BUN 13 10/14/2021 02:23 AM    Creatinine 0.99 10/14/2021 02:23 AM    BUN/Creatinine ratio 13 10/14/2021 02:23 AM    GFR est AA >60 10/14/2021 02:23 AM    GFR est non-AA >60 10/14/2021 02:23 AM    Calcium 9.6 10/14/2021 02:23 AM    Bilirubin, total 0.4 10/14/2021 02:23 AM    Alk. phosphatase 74 10/14/2021 02:23 AM    Protein, total 7.9 10/14/2021 02:23 AM    Albumin 4.2 10/14/2021 02:23 AM    Globulin 3.7 10/14/2021 02:23 AM    A-G Ratio 1.1 10/14/2021 02:23 AM    ALT (SGPT) 16 10/14/2021 02:23 AM    AST (SGOT) 19 10/14/2021 02:23 AM     No results found for: VALF2, VALAC, VALP, VALPR, DS6, CRBAM, CRBAMP, CARB2, XCRBAM  No results found for: LITHM  RADIOLOGY REPORTS:(reviewed/updated 10/15/2021)  XR CHEST PORT    Result Date: 9/10/2021  EXAM:  XR CHEST PORT INDICATION:  Chest Pain COMPARISON:  2018 FINDINGS: A portable AP radiograph of the chest was obtained at 1006 hours. . The lungs are clear.   The cardiac and mediastinal contours and pulmonary vascularity are normal.  The bones and soft tissues are grossly within normal limits. No acute abnormality identified. PAST PSYCHIATRIC HISTORY:  The patient reports that he was diagnosed with ADHD at the age of 6 or 4 years and was also hospitalized once but does not remember the reason for this and could not even recall what hospital he was in. States that he has never been in treatment subsequent to this. He started smoking marijuana at the age of 13 and says that for years he smoked it twice a week and never really exceeded that amount. Denies regular or heavy use of alcohol or other recreational substances. Denies any prior history of suicide attempts. PSYCHOSOCIAL HISTORY:  The patient was living in an apartment in [place] yesterday when his lease . States that his belongings are still there and he is looking for a new place. He has been involved with his current girlfriend for 2 years and they have had a good relationship until recently when they have been arguing more. He does not have any children. 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 Denies any major legal stressors. His family is involved in his care and he keeps in regular contact with them including his parents. Notes that his mother is an alcoholic but has not been drinking recently. MENTAL STATUS EXAM:  The patient is a young [place] American male who is dressed in casual street clothes. He is calm, pleasant, and cooperative during the interview. Makes good eye contact. His speech is spontaneous and coherent. Mood is reported as being fair and his affect is reactive. Denies any active suicidal ideation or plan. Denies any perceptual abnormalities. Denies any delusions. His thought process is logical and goal-directed. Cognitively, he is awake and alert, oriented to time, place, and person. Intelligence is average, memory is intact, and fund of knowledge is adequate.   Insight is partial.  Judgment is fair.    ASSESSMENT AND PLAN/DIAGNOSIS:  Mood disorder unspecified, rule out adjustment disorder. At the present time, the patient wants to be discharged home. His expression of suicidal ideation was in a moment of stress and disappointment after an argument with his girlfriend and he has not had any suicidal thoughts subsequently or prior to this. He is requesting to be discharged home today to the care of his brother and appears to be stable for this. He has been calm and cooperative during his stay in the hospital and declines to be on a psychotropic medication. At the present time, he appears safe to be discharged against medical advice. He understands the consequences of leaving and is encouraged to return back if his suicidal thoughts re-emerge or his depression gets any worse. Estimated length of stay is 1 day. His strengths include support from his family and stable occupation.       Kaylynn Lopez MD      [id number]  D:  10/14/2021 13:45  T:  10/14/2021 15:56  JOB #:  [id number]

## 2021-10-14 NOTE — BSMART NOTE
Comprehensive Assessment Form Part 1      Section I - Disposition    Unspecified Mood Disorder      The Medical Doctor to Psychiatrist conference was not completed. The Medical Doctor is in agreement with Psychiatrist disposition because of (reason) n/a. The plan is to admit inpatient at [facility] 727/02  The on-call Psychiatrist consulted was Moy Schneider NP  The admitting Psychiatrist will be Fabiola Telles NP for Dr Arleth Leal   The admitting Diagnosis is Unspecified Mood Disorder. The Payor source is [place] [employer]/[facility]. Section II - Integrated Summary  Summary:  Per triage, \"Pt arrives ambulatory via squad w c/c of SI with plan. EMS reports pt was having problems with his girlfriend and he called her to let her know he was going to jump off a bridge. Pt's girlfriend called and passed along this info to 911. EMS reports pt may have been kicked out of his apartment tonight. Pt reports history of asthma and last month tested positive for COVID-19\"       Patient is a 19year old male that arrived to the ER via EMS on a voluntary basis. BSMART met with patient face to face. Patient reported that his lease ended today and he can no longer live in his apartment after today. He shared he will gather his belongings tomorrow. When this writer asked patient if he is homeless, pt said \"yes but no. \" He stated there is a possibility he can stay with his mother. Pt then shared he was talking to his girlfriend on the phone earlier and they got into an argument. Per patient, his girlfriend asked him if he wants to be friends with her, and pt reported this hurt him because he stated \"I love her too much. \" This then led to patient walking to the bridge. He reported his intention was to jump off, but his girlfriend was able to stop him. Per patient, she \"talked me out of it. \" Pt reported he is dealing with housing stressors and relationship stressors.  He also expressed feeling depressed about a month ago when he found out his lease would end. Currently, pt denied SI. Pt reported to this writer that he engaged in self-harm via cutting about 2 months ago and stated it was with the intention to harm himself. Pt was not admitted to the hospital and said the cuts were not deep. He denied HI. Denied legal issues and history of trauma. Pt is using cannabis 1-2x/weekly. He last used 3 days ago. He denied AH. When asked about VH, pt state \"sometimes I see stuff\" but was unable to explain what it was. Pt states he does not take medication and is not diagnosed with anything. He reported 1 inpatient hospitalization at the age of 4 for SI. This writer asked pt if mother can be contacted, pt declined verbal consent. He is currently employed at [employer]. This writer consulted with on-call provider, Corona Piper NP. NP agrees patient needs to be admitted to an inpatient facility. If pt denies, [facility] should be contacted for TDO assessment. Patient has agreed to stay for voluntary inpatient admission. Patient has been accepted to [facility] [place] bed/room 727/02 by Gillian Weiner NP for Dr. Martha Penaloza. ADAIR Romero informed to give report to [phone]. ER provider, Dr Sonia Sawant is in agreement with this disposition. The patienthas demonstrated mental capacity to provide informed consent. The information is given by the patient. The Chief Complaint is SI. The Precipitant Factors are housing stressors and relationship stressors. Previous Hospitalizations: yes, at 4years old at [facility]  The patient has not previously been in restraints. Current Psychiatrist and/or  is N/A. Lethality Assessment:    The potential for suicide noted by the following: intent . The potential for homicide is not noted. The patient has not been a perpetrator of sexual or physical abuse. There are not pending charges. The patient is felt to be at risk for self harm or harm to others.   The attending nurse was advised the patient needs supervision. Section III - Psychosocial  The patient's overall mood and attitude is depressed. Feelings of helplessness and hopelessness are observed by verbal statements. Generalized anxiety is not observed. Panic is not observed. Phobias are not observed. Obsessive compulsive tendencies are not observed. Section IV - Mental Status Exam  The patient's appearance shows no evidence of impairment. The patient's behavior shows no evidence of impairment. The patient is oriented to time, place, person and situation. The patient's speech shows no evidence of impairment. The patient's mood is depressed. The range of affect is flat. The patient's thought content demonstrates no evidence of impairment. The thought process shows no evidence of impairment. The patient's perception shows no evidence of impairment. The patient's memory shows no evidence of impairment. The patient's appetite shows no evidence of impairment. The patient's sleep shows no evidence of impairment. The patient shows little insight. The patient's judgement is psychologically impaired. Section V - Substance Abuse  The patient is using substances. The patient is using cannabis by inhalation for unknown with last use on 3 days ago, per patient. The patient has experienced the following withdrawal symptoms: N/A. Section VI - Living Arrangements  The patient is single. The patient is currently homeless. The patient has no children. The patient does not plan to return home upon discharge. The patient does not have legal issues pending. The patient's source of income comes from employment. Scientologist and cultural practices have not been voiced at this time. The patient's greatest support comes from girlfriend and this person will be involved with the treatment.     The patient has not been in an event described as horrible or outside the realm of ordinary life experience either currently or in the past.  The patient has not been a victim of sexual/physical abuse. Section VII - Other Areas of Clinical Concern  The highest grade achieved is 10th grade with the overall quality of school experience being described as unknown. The patient is currently employed and speaks [place] as a primary language. The patient has no communication impairments affecting communication. The patient's preference for learning can be described as: can read and write adequately.   The patient's hearing is normal.  The patient's vision is normal.      NIC Billings, Supervisee in Social Work

## 2021-10-14 NOTE — DISCHARGE INSTRUCTIONS
DISCHARGE SUMMARY    Mike Ribeiro  : 2000  MRN: [id number]    The patient Jefferson Lloyd exhibits the ability to control behavior in a less restrictive environment. Patient's level of functioning is improving. No assaultive/destructive behavior has been observed for the past 24 hours. No suicidal/homicidal threat or behavior has been observed for the past 24 hours. There is no evidence of serious medication side effects. Patient has not been in physical or protective restraints for at least the past 24 hours. If weapons involved, how are they secured? No weapons     Is patient aware of and in agreement with discharge plan? He is aware of discharge and is in agreement     Arrangements for medication: no prescription given     Copy of discharge instructions to provider?:  Fax to [facility] and [address] [place] [address] for transportation home: brother to      Keep all follow up appointments as scheduled, continue to take prescribed medications per physician instructions. Mental health crisis number:  272 or your local mental health crisis line number at 710-3162 or [address] [facility] Emergency WARM LINE      5-067-670-AV ([phone])      M-F: 9am to 9pm      Sat & Sun: 5pm - 9pm  National suicide prevention lines:                             3-093-TZYTHIG (4-296-969-0529)       5-643-835-TALK ([phone])    Crisis Text Line:  Text HOME to [address] from Nurse    PATIENT INSTRUCTIONS:      What to do at Home:  Recommended activity: Activity as tolerated,         *  Please give a list of your current medications to your Primary Care Provider. *  Please update this list whenever your medications are discontinued, doses are      changed, or new medications (including over-the-counter products) are added. *  Please carry medication information at all times in case of emergency situations.     These are general instructions for a healthy lifestyle:    No smoking/ No tobacco products/ Avoid exposure to second hand smoke  Surgeon General's Warning:  Quitting smoking now greatly reduces serious risk to your health. Obesity, smoking, and sedentary lifestyle greatly increases your risk for illness    A healthy diet, regular physical exercise & weight monitoring are important for maintaining a healthy lifestyle    You may be retaining fluid if you have a history of heart failure or if you experience any of the following symptoms:  Weight gain of 3 pounds or more overnight or 5 pounds in a week, increased swelling in our hands or feet or shortness of breath while lying flat in bed. Please call your doctor as soon as you notice any of these symptoms; do not wait until your next office visit. The discharge information has been reviewed with the patient. The patient verbalized understanding. Discharge medications reviewed with the patient and appropriate educational materials and side effects teaching were provided.   ___________________________________________________________________________________________________________________________________

## 2021-10-14 NOTE — ROUTINE PROCESS
TRANSFER - OUT REPORT:    Verbal report given to Williams(name) on Christopher Reza  being transferred to Psych (unit) for routine progression of care       Report consisted of patients Situation, Background, Assessment and   Recommendations(SBAR). Information from the following report(s) SBAR, ED Summary, Intake/Output, MAR and Recent Results was reviewed with the receiving nurse. Lines:       Opportunity for questions and clarification was provided.       Patient transported with:   Eryn Huber

## 2021-10-15 NOTE — DISCHARGE SUMMARY
DISCHARGE SUMMARY    Some parts of the discharge summary are from the initial Psychiatric interview that was done on admission by the admitting psychiatrist.     Date of Admission: 10/14/2021    Date of Discharge: 10/15/2021     TYPE OF DISCHARGE:     AMA - YES   RELEASED BY THE TDO COURT - NO    REGULAR - NO    CHIEF COMPLAINT:  \"I feel much better today. \"     HISTORY OF PRESENT ILLNESS:  The patient is a 75-year-old [facility] American man who is currently admitted after he presented to the ER yesterday requesting help for feeling depressed and hopeless and having thoughts of suicide. He had reported that he walked to the nearest bridge with a thought of wanting to end his life, but he spoke to his girlfriend on the phone and she managed to persuade him to come to the hospital.  Reports that him and his girlfriend have been having some conflict recently and she told him that she did not love him in a state of anger. Reports that he felt hopeless after this and thought about suicide. Denies that he had any serious intent to do that. Denies depressed mood recently other than yesterday and denies any neurovegetative symptoms of depression. States that he smokes marijuana once or twice a week but has not smoked it in 2 weeks. He reports sleeping well and his energy and mood have been fair. Denies any perceptual abnormalities. Denies any delusions. Since his arrival on the unit, he has been calm and cooperative. He requests to be discharged home and says he feels safe going home and does not want to be on any psychotropic medications. Contact was made with his siblings who agreed that he would come and live with them because he has lost his housing and they felt that he was safe to come back.   He was encouraged to stay but does not want to do so and he was discharged against medical advice.     PAST MEDICAL HISTORY:  Reviewed as per the history and physical exam.             Past Medical History:   Diagnosis Date    ADHD (attention deficit hyperactivity disorder)      Asthma                Prior to Admission medications    Medication Sig Start Date End Date Taking? Authorizing Provider   albuterol (PROVENTIL HFA, VENTOLIN HFA, PROAIR HFA) 90 mcg/actuation inhaler Take 2 Puffs by inhalation every four (4) hours as needed for Wheezing. Patient not taking: Reported on 10/14/2021 12/12/18     Jose Luis Mcclendon, [address]          Vitals:     10/14/21 0222   BP: 135/85   Pulse: 79   Resp: 16   Temp: 98.6 °F (37 °C)   SpO2: 99%   Weight: 71.2 kg (157 lb)   Height: 6' (1.829 m)            Lab Results   Component Value Date/Time     WBC 7.8 10/14/2021 02:23 AM     HGB 13.9 10/14/2021 02:23 AM     HCT 41.3 10/14/2021 02:23 AM     PLATELET 196 72/86/7851 02:23 AM     MCV 91.0 10/14/2021 02:23 AM            Lab Results   Component Value Date/Time     Sodium 136 10/14/2021 02:23 AM     Potassium 3.5 10/14/2021 02:23 AM     Chloride 105 10/14/2021 02:23 AM     CO2 27 10/14/2021 02:23 AM     Anion gap 4 (L) 10/14/2021 02:23 AM     Glucose 94 10/14/2021 02:23 AM     BUN 13 10/14/2021 02:23 AM     Creatinine 0.99 10/14/2021 02:23 AM     BUN/Creatinine ratio 13 10/14/2021 02:23 AM     GFR est AA >60 10/14/2021 02:23 AM     GFR est non-AA >60 10/14/2021 02:23 AM     Calcium 9.6 10/14/2021 02:23 AM     Bilirubin, total 0.4 10/14/2021 02:23 AM     Alk.  phosphatase 74 10/14/2021 02:23 AM     Protein, total 7.9 10/14/2021 02:23 AM     Albumin 4.2 10/14/2021 02:23 AM     Globulin 3.7 10/14/2021 02:23 AM     A-G Ratio 1.1 10/14/2021 02:23 AM     ALT (SGPT) 16 10/14/2021 02:23 AM     AST (SGOT) 19 10/14/2021 02:23 AM      No results found for: VALF2, VALAC, VALP, VALPR, DS6, CRBAM, CRBAMP, CARB2, XCRBAM  No results found for: LITHM  RADIOLOGY REPORTS:(reviewed/updated 10/15/2021)  XR CHEST PORT     Result Date: 9/10/2021  EXAM:  XR CHEST PORT INDICATION:  Chest Pain COMPARISON:  2018 FINDINGS: A portable AP radiograph of the chest was obtained at 1006 hours. . The lungs are clear. The cardiac and mediastinal contours and pulmonary vascularity are normal.  The bones and soft tissues are grossly within normal limits.      No acute abnormality identified.      PAST PSYCHIATRIC HISTORY:  The patient reports that he was diagnosed with ADHD at the age of 6 or 4 years and was also hospitalized once but does not remember the reason for this and could not even recall what hospital he was in. States that he has never been in treatment subsequent to this. He started smoking marijuana at the age of 13 and says that for years he smoked it twice a week and never really exceeded that amount. Denies regular or heavy use of alcohol or other recreational substances. Denies any prior history of suicide attempts.     PSYCHOSOCIAL HISTORY:  The patient was living in an apartment in [place] yesterday when his lease . States that his belongings are still there and he is looking for a new place. He has been involved with his current girlfriend for 2 years and they have had a good relationship until recently when they have been arguing more. He does not have any children. 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 Denies any major legal stressors. His family is involved in his care and he keeps in regular contact with them including his parents. Notes that his mother is an alcoholic but has not been drinking recently.     MENTAL STATUS EXAM:  The patient is a young [facility] American male who is dressed in casual street clothes. He is calm, pleasant, and cooperative during the interview. Makes good eye contact. His speech is spontaneous and coherent. Mood is reported as being fair and his affect is reactive. Denies any active suicidal ideation or plan. Denies any perceptual abnormalities. Denies any delusions. His thought process is logical and goal-directed.   Cognitively, he is awake and alert, oriented to time, place, and person. Intelligence is average, memory is intact, and fund of knowledge is adequate. Insight is partial.  Judgment is fair.     ASSESSMENT AND PLAN/DIAGNOSIS:  Mood disorder unspecified, rule out adjustment disorder.     At the present time, the patient wants to be discharged home. His expression of suicidal ideation was in a moment of stress and disappointment after an argument with his girlfriend and he has not had any suicidal thoughts subsequently or prior to this. He is requesting to be discharged home today to the care of his brother and appears to be stable for this. He has been calm and cooperative during his stay in the hospital and declines to be on a psychotropic medication. At the present time, he appears safe to be discharged against medical advice. He understands the consequences of leaving and is encouraged to return back if his suicidal thoughts re-emerge or his depression gets any worse.     Estimated length of stay is 1 day. His strengths include support from his family and stable occupation.  [address]:  Patient was admitted to the inpatient psychiatry unit for acute psychiatric stabilization in regards to symptomatology as described in the HPI above and placed on Q15 minute checks and withdrawal precautions. While on the unit Deirdre Carter was involved in individual, group, occupational and milieu therapy. Deirdre Carter  was started back on the patients usual medication regimen as well as PRN medications. Deirdre Carter was discharged AMA at their request. Patient was considered competent to make this decision and did not appear to be a danger to themselves or to others. I discussed the risks of leaving. Patient verbalized understanding and still want's to leave.  There was no behavior indicating instability of their psychiatric symptoms and the patient appears to be able to make a reasonable judgment regarding their own care, manipulate this information, and indicate an appropriate choice. Emelina Joshi was discharged at their request with follow up to be arranged. No prescriptions were provided at the time of discharge. Patient is fully aware of the risks and benefits of staying in the hosptal for completion of treatment tvs. leaving the hospital AMA. Patient had expressed a desire to follow up with appointments and remains motivated to be in treatment after discharge. The patient verbalized understanding of discharge instructions. DISCHARGE DIAGNOSIS:    ICD-10-CM ICD-9-CM    1. Suicidal ideation  R45. 851 V62.84           Discharge Medication List as of 10/14/2021  1:06 PM           Follow-up Information     Follow up With Specialties Details Why Contact Info    [address] [facility]  On 10/14/2021 please call for same day access  [address] [place] [address]  On 10/14/2021 please call [phone] for same day access [facility] [address] [phone]        WOUND CARE: none needed. PROGNOSIS:   Fair / Guarded based on nature of patient's pathology/ies and treatment compliance issues. Prognosis is greatly dependent upon patient's ability to  follow up on psychiatric/psychotherapy appointments as well as to comply with psychiatric medications as prescribed which the individual has declined to do.

## 2021-10-18 NOTE — PROGRESS NOTES
Reached out to patient at [phone] for follow up. Patient stated he had not called [facility] or [address] [place] [address] yet and asked if follow up was required. Patient encouraged to follow up and advised to call back if any questions.

## 2022-03-06 ENCOUNTER — HOSPITAL ENCOUNTER (EMERGENCY)
Age: 22
Discharge: HOME | End: 2022-03-06
Attending: EMERGENCY MEDICINE
Payer: MEDICAID

## 2022-03-06 VITALS
HEIGHT: 72 IN | WEIGHT: 182.98 LBS | TEMPERATURE: 98.3 F | DIASTOLIC BLOOD PRESSURE: 74 MMHG | HEART RATE: 97 BPM | BODY MASS INDEX: 24.78 KG/M2 | RESPIRATION RATE: 18 BRPM | OXYGEN SATURATION: 98 % | SYSTOLIC BLOOD PRESSURE: 134 MMHG

## 2022-03-06 DIAGNOSIS — Z20.822 SUSPECTED COVID-19 VIRUS INFECTION: ICD-10-CM

## 2022-03-06 DIAGNOSIS — B34.9 VIRAL SYNDROME: Primary | ICD-10-CM

## 2022-03-06 DIAGNOSIS — Z72.0 TOBACCO ABUSE: ICD-10-CM

## 2022-03-06 PROCEDURE — 99283 EMERGENCY DEPT VISIT LOW MDM: CPT

## 2022-03-06 PROCEDURE — U0005 INFEC AGEN DETEC AMPLI PROBE: HCPCS

## 2022-03-06 RX ORDER — ONDANSETRON 4 MG/1
4 TABLET, ORALLY DISINTEGRATING ORAL
Qty: 8 TABLET | Refills: 0 | Status: SHIPPED | OUTPATIENT
Start: 2022-03-06

## 2022-03-06 RX ORDER — ACETAMINOPHEN 500 MG
1000 TABLET ORAL
Qty: 20 TABLET | Refills: 0 | Status: SHIPPED | OUTPATIENT
Start: 2022-03-06

## 2022-03-06 NOTE — ED PROVIDER NOTES
EMERGENCY DEPARTMENT HISTORY AND PHYSICAL EXAM      Date: 3/6/2022  Patient Name: Delfino Quintana    History of Presenting Illness     Chief Complaint   Patient presents with    Covid Testing    Vomiting    Headache     History Provided By: Patient    HPI: Delfino Quintana, 19 y.o. male with medical history significant for asthma, ADHD, tobacco abuse who presents via self to the ED with cc of acute mild intermittent headaches, nausea, vomiting, body aches, chills, loss of taste and smell since 2/14/2022. Endorses that he was evaluated at another facility on 2/28/2022 and diagnosed ear infection, but did not receive COVID-19 testing. Patient is requesting COVID-19 testing today. Denies any new or worsening symptoms. Endorses last episode of emesis was approximately 2 days ago. Tolerating p.o. fluids and food well today. No chest pain, shortness of breath, wheezing, abdominal pain, persistent nausea or vomiting, diarrhea, constipation, lightheadedness, dizziness, syncope, seizure, neck pain or stiffness, sore throat, ear pain. No medications or modifying factors. PCP: None    There are no other complaints, changes, or physical findings at this time. No current facility-administered medications on file prior to encounter. Current Outpatient Medications on File Prior to Encounter   Medication Sig Dispense Refill    albuterol (PROVENTIL HFA, VENTOLIN HFA, PROAIR HFA) 90 mcg/actuation inhaler Take 2 Puffs by inhalation every four (4) hours as needed for Wheezing. (Patient not taking: Reported on 10/14/2021) 1 Inhaler 0     Past History     Past Medical History:  Past Medical History:   Diagnosis Date    ADHD (attention deficit hyperactivity disorder)     Asthma      Past Surgical History:  No past surgical history on file. Family History:  No family history on file.   Social History:  Social History     Tobacco Use    Smoking status: Current Every Day Smoker    Smokeless tobacco: Never Used Substance Use Topics    Alcohol use: No    Drug use: No     Allergies:  No Known Allergies  Review of Systems   Review of Systems   Constitutional: Positive for appetite change and chills. Negative for activity change, diaphoresis, fatigue, fever and unexpected weight change. HENT: Positive for congestion and rhinorrhea. Negative for dental problem, drooling, ear discharge, ear pain, facial swelling, hearing loss, nosebleeds, postnasal drip, sinus pressure, sinus pain, sneezing, sore throat, tinnitus, trouble swallowing and voice change. Eyes: Negative. Negative for photophobia, pain, redness and visual disturbance. Respiratory: Negative for apnea, cough, chest tightness, shortness of breath, wheezing and stridor. Cardiovascular: Negative. Negative for chest pain, palpitations and leg swelling. Gastrointestinal: Positive for nausea and vomiting. Negative for abdominal pain, constipation and diarrhea. Resolved   Genitourinary: Negative. Negative for decreased urine volume, dysuria, flank pain, frequency and hematuria. Musculoskeletal: Positive for myalgias. Negative for arthralgias, back pain, gait problem, joint swelling, neck pain and neck stiffness. Skin: Negative. Negative for rash. Neurological: Positive for headaches. Negative for dizziness, tremors, seizures, syncope, facial asymmetry, speech difficulty, weakness, light-headedness and numbness. Psychiatric/Behavioral: Negative. Negative for confusion. Physical Exam   Physical Exam  Vitals and nursing note reviewed. Constitutional:       General: He is not in acute distress. Appearance: Normal appearance. He is well-developed. He is not ill-appearing, toxic-appearing or diaphoretic. Comments: Well-appearing young male sitting upright in bed in no apparent distress. Speaking clear complete sentences. HENT:      Head: Normocephalic and atraumatic. Jaw: There is normal jaw occlusion. No trismus.       Right Ear: Hearing, tympanic membrane, ear canal and external ear normal.      Left Ear: Hearing, tympanic membrane, ear canal and external ear normal.      Nose: No mucosal edema, congestion or rhinorrhea. Right Sinus: No maxillary sinus tenderness or frontal sinus tenderness. Left Sinus: No maxillary sinus tenderness or frontal sinus tenderness. Mouth/Throat:      Lips: No lesions. Mouth: Mucous membranes are moist. No angioedema. Dentition: No dental abscesses. Pharynx: Oropharynx is clear. Uvula midline. No pharyngeal swelling, oropharyngeal exudate, posterior oropharyngeal erythema or uvula swelling. Tonsils: No tonsillar exudate or tonsillar abscesses. 1+ on the right. 1+ on the left. Eyes:      Extraocular Movements: Extraocular movements intact. Conjunctiva/sclera: Conjunctivae normal.      Pupils: Pupils are equal, round, and reactive to light. Cardiovascular:      Rate and Rhythm: Normal rate and regular rhythm. Heart sounds: Normal heart sounds. Pulmonary:      Effort: Pulmonary effort is normal. No tachypnea, accessory muscle usage or respiratory distress. Breath sounds: Normal breath sounds and air entry. No stridor. No decreased breath sounds, wheezing, rhonchi or rales. Abdominal:      General: Abdomen is flat. Bowel sounds are normal. There is no distension. Palpations: Abdomen is soft. Abdomen is not rigid. Tenderness: There is no abdominal tenderness. There is no right CVA tenderness, left CVA tenderness, guarding or rebound. Negative signs include Olson's sign and McBurney's sign. Musculoskeletal:         General: Normal range of motion. Cervical back: Normal range of motion. Skin:     General: Skin is warm and dry. Coloration: Skin is not pale. Neurological:      Mental Status: He is alert and oriented to person, place, and time. He is not disoriented. GCS: GCS eye subscore is 4. GCS verbal subscore is 5.  GCS motor subscore is 6. Cranial Nerves: No cranial nerve deficit. Sensory: No sensory deficit. Motor: No tremor or seizure activity. Psychiatric:         Speech: Speech normal.         Behavior: Behavior normal.         Thought Content: Thought content normal.         Judgment: Judgment normal.       Diagnostic Study Results   Labs -   No results found for this or any previous visit (from the past 12 hour(s)). Radiologic Studies -   No orders to display     No results found. Medical Decision Making   I am the first provider for this patient. I reviewed the vital signs, available nursing notes, past medical history, past surgical history, family history and social history. Vital Signs-Reviewed the patient's vital signs. Patient Vitals for the past 24 hrs:   Temp Pulse Resp BP SpO2   03/06/22 1803 -- 97 -- -- 98 %   03/06/22 1801 -- -- -- 134/74 --   03/06/22 1723 98.3 °F (36.8 °C) (!) 113 18 (!) 144/75 97 %     Pulse Oximetry Analysis - 97% on RA (normal)    Records Reviewed: Nursing Notes, Old Medical Records, Previous Radiology Studies and Previous Laboratory Studies    Provider Notes (Medical Decision Making): Well appearing/ healthy 17-year-old male presents with mild intermittent congestion, body aches, chills, nausea, vomiting, decreased taste and smell 2/14/2022. Specifically denies any nausea or vomiting today and is tolerating p.o. well. Patient is requesting a COVID-19 test.  Patient is currently hemodynamically stable and afebrile. Exam is benign with no adventitious lung sounds. Soft abdomen/nontender. Differential includes viral syndrome, Covid, flu, gastroenteritis. Obtain Covid screening and treat symptoms as needed. TOBACCO COUNSELING:  Upon evaluation, pt expressed that they are a current tobacco user. Pt has been counseled on the dangers of smoking and was encouraged to quit as soon as possible in order to decrease further risks to their health.  Pt has conveyed their understanding of the risks involved should they continue to use tobacco products. 4 min discussion. ED Course:   Initial assessment performed. The patients presenting problems have been discussed, and they are in agreement with the care plan formulated and outlined with them. I have encouraged them to ask questions as they arise throughout their visit. ED Course as of 03/06/22 1809   Katja Silva Mar 06, 2022   1737 Pt requesting to go to the vending machine. [SM]      ED Course User Index  [SM] Michele Berry PA-C       Progress Note:   Updated pt on all returned results and findings. Discussed the importance of proper follow up as referred below along with return precautions. Pt in agreement with the care plan and expresses agreement with and understanding of all items discussed. Disposition:  5:49 PM  I have discussed with patient their diagnosis, treatment, and follow up plan. The patient agrees to follow up as outlined in discharge paperwork and also to return to the ED with any worsening. Emily Mcclendon PA-C      PLAN:  1. Current Discharge Medication List      START taking these medications    Details   ondansetron (ZOFRAN ODT) 4 mg disintegrating tablet Take 1 Tablet by mouth every eight (8) hours as needed for Nausea. Qty: 8 Tablet, Refills: 0  Start date: 3/6/2022      acetaminophen (TYLENOL) 500 mg tablet Take 2 Tablets by mouth every six (6) hours as needed for Pain. Qty: 20 Tablet, Refills: 0  Start date: 3/6/2022           2. Follow-up Information     Follow up With Specialties Details Why [address]  Schedule an appointment as soon as possible for a visit in 1 week As needed [facility]  [phone]    [facility] EMERGENCY DEPT Emergency Medicine Go to  As needed, If symptoms worsen [address] [facility]  [phone]        Return to ED if worse     Diagnosis     Clinical Impression:   1. Viral syndrome    2.  Suspected COVID-19 virus infection    3. Tobacco abuse            Please note that this dictation was completed with Dragon, computer voice recognition software. Quite often unanticipated grammatical, syntax, homophones, and other interpretive errors are inadvertently transcribed by the computer software. Please disregard these errors. Additionally, please excuse any errors that have escaped final proofreading.

## 2022-03-06 NOTE — ED NOTES
Patient is alert and oriented x 4 and in no acute distress at this time. Respirations are at a regular rate, depth, and pattern. Patient updated on plan of care and has no questions or concerns at this time. Call bell within reach. Will continue to monitor. Please reference nursing assessment. Emergency Department Nursing Plan of Care       The Nursing Plan of Care is developed from the Nursing assessment and Emergency Department Attending provider initial evaluation. The plan of care may be reviewed in the ED Provider note.     The Plan of Care was developed with the following considerations:   Patient / Family readiness to learn indicated by:verbalized understanding and successful return demonstration  Persons(s) to be included in education: patient  Barriers to Learning/Limitations:No    Signed     Brea Deleon RN    3/6/2022   5:51 PM

## 2022-03-06 NOTE — ED TRIAGE NOTES
Per patient, developed a headache, dizzy sensation  and malaise in February. Stated the symptoms are getting worse. Patient c/o intermittent vomiting and loss of taste and smell this past Thursday.   Said seen at another ED without a diagnosis

## 2022-03-06 NOTE — DISCHARGE INSTRUCTIONS
It was a pleasure taking care of you at [facility] Emergency Department today. We know that when you come to [facility], you are entrusting us with your health, comfort, and safety. Our physicians and nurses honor that trust, and we truly appreciate the opportunity to care for you and your loved ones. We also value our feedback. If you receive a survey about your Emergency Department experience today, please fill it out. We care about our patients' feedback, and we listen to what you have to say. Thank you!

## 2022-03-07 ENCOUNTER — PATIENT OUTREACH (OUTPATIENT)
Dept: CASE MANAGEMENT | Age: 22
End: 2022-03-07

## 2022-03-07 LAB
SARS-COV-2, XPLCVT: NOT DETECTED
SOURCE, COVRS: NORMAL

## 2022-07-27 ENCOUNTER — APPOINTMENT (OUTPATIENT)
Dept: GENERAL RADIOLOGY | Age: 22
End: 2022-07-27
Attending: EMERGENCY MEDICINE
Payer: MEDICAID

## 2022-07-27 ENCOUNTER — HOSPITAL ENCOUNTER (EMERGENCY)
Age: 22
Discharge: HOME | End: 2022-07-27
Attending: EMERGENCY MEDICINE
Payer: MEDICAID

## 2022-07-27 VITALS
OXYGEN SATURATION: 93 % | SYSTOLIC BLOOD PRESSURE: 107 MMHG | HEART RATE: 108 BPM | BODY MASS INDEX: 21.67 KG/M2 | WEIGHT: 160 LBS | HEIGHT: 72 IN | TEMPERATURE: 98.3 F | DIASTOLIC BLOOD PRESSURE: 73 MMHG | RESPIRATION RATE: 14 BRPM

## 2022-07-27 DIAGNOSIS — Z72.0 TOBACCO ABUSE: ICD-10-CM

## 2022-07-27 DIAGNOSIS — J45.20 MILD INTERMITTENT ASTHMA WITHOUT COMPLICATION: Primary | ICD-10-CM

## 2022-07-27 DIAGNOSIS — Z20.822 ENCOUNTER FOR SCREENING LABORATORY TESTING FOR COVID-19 VIRUS: ICD-10-CM

## 2022-07-27 LAB
FLUAV RNA SPEC QL NAA+PROBE: NOT DETECTED
FLUBV RNA SPEC QL NAA+PROBE: NOT DETECTED
SARS-COV-2, COV2: NOT DETECTED

## 2022-07-27 PROCEDURE — 74011250637 HC RX REV CODE- 250/637: Performed by: EMERGENCY MEDICINE

## 2022-07-27 PROCEDURE — 71045 X-RAY EXAM CHEST 1 VIEW: CPT

## 2022-07-27 PROCEDURE — 99284 EMERGENCY DEPT VISIT MOD MDM: CPT

## 2022-07-27 PROCEDURE — 87636 SARSCOV2 & INF A&B AMP PRB: CPT

## 2022-07-27 RX ORDER — DIPHENHYDRAMINE HCL 25 MG
50 CAPSULE ORAL
Status: COMPLETED | OUTPATIENT
Start: 2022-07-27 | End: 2022-07-27

## 2022-07-27 RX ORDER — ALBUTEROL SULFATE 90 UG/1
2 AEROSOL, METERED RESPIRATORY (INHALATION)
Qty: 1 EACH | Refills: 0 | Status: SHIPPED | OUTPATIENT
Start: 2022-07-27

## 2022-07-27 RX ADMIN — DIPHENHYDRAMINE HYDROCHLORIDE 50 MG: 25 CAPSULE ORAL at 04:01

## 2022-07-27 NOTE — ED NOTES
See nursing triage note. Warm blanket offered, call bell within reach, safety precautions in place, bed locked and in the lowest position. Emergency Department Nursing Plan of Care       The Nursing Plan of Care is developed from the Nursing assessment and Emergency Department Attending provider initial evaluation. The plan of care may be reviewed in the ED Provider note.     The Plan of Care was developed with the following considerations:   Patient / Family readiness to learn indicated by:verbalized understanding  Persons(s) to be included in education: patient  Barriers to Learning/Limitations:No    Signed     Bubba Addison, ADAIR    7/27/2022   3:24 AM

## 2022-07-27 NOTE — ED TRIAGE NOTES
Patient presents to the ED with c/o being exposed to covid at work last Thursday and now having a dry cough and had an asthma attack in the shower on Saturday. Denies pain. Wants a covid test. Pt tachy and not sitting down for triage.

## 2022-07-27 NOTE — ED PROVIDER NOTES
EMERGENCY DEPARTMENT HISTORY AND PHYSICAL EXAM       Please note that this dictation was completed with [employer], the computer voice recognition software. Quite often unanticipated grammatical, syntax, homophones, and other interpretive errors are inadvertently transcribed by the computer software. Please disregard these errors. Please excuse any errors that have escaped final proofreading. Date: 7/27/2022  Patient Name: Avel Carroll  Patient Age and Sex: 23 y.o. male    History of Presenting Illness     Chief Complaint   Patient presents with    Concern For COVID-19 (Coronavirus)       History Provided By: Patient     Chief Complaint: covid exposure, sob      HPI: Avel Carroll, is a 23 y.o. male smoker with history of asthma presents to the ED with concerns about potentially having coronavirus. He says that he was exposed to somebody who was tested positive with the virus on Thursday at work. The patient is not vaccinated. Asthma is normally well controlled, he says that he has not used albuterol in multiple years. However last Saturday he took a shower and subsequently felt very wheezy. His aunt, who also has asthma, gave him her inhaler and after taking a few puffs from it he felt better. Since then he continues to have some mild persistent chest tightness and shortness of breath. No cough, fevers or chills. No other associated symptoms. 4    Pt denies any other alleviating or exacerbating factors. No other associated signs or symptoms. There are no other complaints, changes or physical findings at this time. PCP: None    Past History   All documented elements of the [facility] reviewed and verified by me. -Carlos Nicole MD    Past Medical History:  Past Medical History:   Diagnosis Date    ADHD (attention deficit hyperactivity disorder)     Asthma        Past Surgical History:  No past surgical history on file. Family History:   History reviewed. No pertinent family history.     Social History:  Social History     Tobacco Use    Smoking status: Every Day    Smokeless tobacco: Never   Substance Use Topics    Alcohol use: No    Drug use: No       Current Medications:  No current facility-administered medications on file prior to encounter. Current Outpatient Medications on File Prior to Encounter   Medication Sig Dispense Refill    ondansetron (ZOFRAN ODT) 4 mg disintegrating tablet Take 1 Tablet by mouth every eight (8) hours as needed for Nausea. (Patient not taking: Reported on 7/27/2022) 8 Tablet 0    acetaminophen (TYLENOL) 500 mg tablet Take 2 Tablets by mouth every six (6) hours as needed for Pain. (Patient not taking: Reported on 7/27/2022) 20 Tablet 0    [DISCONTINUED] albuterol (PROVENTIL HFA, VENTOLIN HFA, PROAIR HFA) 90 mcg/actuation inhaler Take 2 Puffs by inhalation every four (4) hours as needed for Wheezing. (Patient not taking: No sig reported) 1 Inhaler 0       Allergies:  No Known Allergies    Review of Systems   All other systems reviewed and negative    Review of Systems   Constitutional:  Negative for fever. HENT:  Negative for congestion. Eyes:  Negative for visual disturbance. Respiratory:  Positive for shortness of breath. Cardiovascular:  Negative for chest pain, palpitations and leg swelling. Gastrointestinal:  Negative for abdominal pain and vomiting. Endocrine: Negative. Genitourinary:  Negative for dysuria and hematuria. Musculoskeletal:  Negative for joint swelling. Skin: Negative. Negative for rash. Neurological:  Negative for weakness, light-headedness, numbness and headaches. Psychiatric/Behavioral: Negative. Negative for confusion. All other systems reviewed and are negative. Physical Exam   Reviewed patients vital signs and nursing note    Physical Exam  Vitals and nursing note reviewed. Constitutional:       Appearance: He is not diaphoretic. HENT:      Head: Atraumatic.       Nose: Nose normal.      Mouth/Throat: Mouth: Mucous membranes are moist.   Eyes:      Extraocular Movements: Extraocular movements intact. Conjunctiva/sclera: Conjunctivae normal.      Pupils: Pupils are equal, round, and reactive to light. Cardiovascular:      Rate and Rhythm: Normal rate and regular rhythm. Pulses: Normal pulses. Heart sounds: Normal heart sounds. Pulmonary:      Effort: Pulmonary effort is normal.      Breath sounds: Normal breath sounds. Abdominal:      General: Bowel sounds are normal.      Palpations: Abdomen is soft. Tenderness: There is no abdominal tenderness. Musculoskeletal:         General: No tenderness. Normal range of motion. Cervical back: Normal range of motion. No rigidity. Skin:     General: Skin is warm and dry. Capillary Refill: Capillary refill takes less than 2 seconds. Coloration: Skin is not pale. Findings: No rash. Neurological:      General: No focal deficit present. Mental Status: He is alert and oriented to person, place, and time. Psychiatric:         Mood and Affect: Mood normal.         Behavior: Behavior normal.       Diagnostic Study Results     Labs - I have personally reviewed and interpreted all laboratory results. Interpretation of available and pertinent results detailed below in MDM. Lorelei Melendez MD, MSc  Recent Results (from the past 25 hour(s))   COVID-19 WITH INFLUENZA A/B    Collection Time: 07/27/22  3:34 AM   Result Value Ref Range    SARS-CoV-2 by PCR Not detected NOTD      Influenza A by PCR Not detected      Influenza B by PCR Not detected         Radiologic Studies - I have personally reviewed and interpreted (see [facility] for brief interpreation of available results) all imaging studies and agree with radiology interpretation and report. - Lorelei Melendez MD, MSc  XR CHEST PORT   Final Result   Clear lungs. Medical Decision Making   I am the first provider for this patient.     Records Reviewed:   I reviewed our electronic medical record system for any past medical records that were available that may contribute to the patient's current condition, including their PMH, surgical history, social and family history. This includes most recent ED visits, any available discharge summaries and prior ECGs, which I have reviewed and interpreted personally. I have summarized most salient findings in my HPI and MDM. Win Boss MD, MSc    I also reviewed the nursing notes and vital signs from today's visit. Nursing notes will be reviewed as they become available in realtime while the pt has been in the ED. Win Boss MD, MSc      Vital Signs-Reviewed the patient's vital signs. Patient Vitals for the past 24 hrs:   Temp Pulse Resp BP SpO2   07/27/22 0337 -- (!) 108 -- -- --   07/27/22 0330 -- -- -- 107/73 93 %   07/27/22 0316 98.3 °F (36.8 °C) (!) 114 14 (!) 144/80 96 %         Pulse ox interpretation: 98% on ra with good pleth. Provider Notes (Medical Decision Making): The evaluation, management, and disposition decisions of this patient have been made in the context of the current COVID-19 pandemic. In my clinical judgment, the balance of clinical factors dictate expedited evaluation and discharge from the ED. I have carefully considered the risk and benefits of prolonged ED workups and/or hospitalization vs their risk of acquiring or transmitting COVID-19. I have made reasonable efforts to conserve healthcare resources and defer to safe outpatient alternatives when feasible. I have also discussed the importance of social distancing and proper hygiene to the patient. Based on an appropriate medical screening exam, there is currently no evidence of an emergency medical condition in the patient, and he is clinically safe for discharge. This was a collective decision made with the patient and/or any available family/caretakers. They expressed understanding and agreement with the above.    Patient's pulse ox interpreted by me personally, with good waveform it is 98% on RA. ED Course:   Initial assessment performed. The patients presenting problems have been discussed, and they are in agreement with the care plan formulated and outlined with them. I have encouraged them to ask questions as they arise throughout their visit. TOBACCO COUNSELING:   Upon evaluation, pt expressed that they are an active tobacco user. For approximately 6 minutes, I counseled pt face-to-face on the dangers of smoking and encouraged quitting as soon as possible in order to decrease further risks to their health. I assessed their readiness to quit smoking and patient states they are actively thinking about it but the habit of smoking as well as euphoric effect of nicotine are barriers. I encouraged them to continue thinking about it and to set a quitting date. I provided specific suggestions on how to quit smoking, including CBT, support groups, acupuncture, medication assistance. Pt has conveyed their understanding of the risks involved should they continue to use tobacco products. Progress note:  Patient has been reassessed and reports feeling considerably better, has normal vital signs and feels comfortable going home. I think this is reasonable as no findings today suggest a life-threatening condition. DISPOSITION: DISCHARGE  The patient's results have been reviewed with patient and available family and/or caregiver. They verbally convey their understanding and agreement of the patient's signs, symptoms, diagnosis, treatment and prognosis and additionally agree to follow up as recommended in the discharge instructions or to return to the Emergency Department should the patient's condition change prior to their follow-up appointment. The patient and available family and/or caregiver verbally agree with the care plan and all of their questions have been answered.  The discharge instructions have also been provided to the them with educational information regarding the patient's diagnosis as well a list of reasons why the patient would want to return to the ER prior to their follow-up appointment should any concerns arise, the patient's condition change or symptoms worsen. Guadalupe Workman MD, Msc    PLAN:  Current Discharge Medication List        CONTINUE these medications which have CHANGED    Details   albuterol (PROVENTIL HFA, VENTOLIN HFA, PROAIR HFA) 90 mcg/actuation inhaler Take 2 Puffs by inhalation every four (4) hours as needed for Wheezing or Shortness of Breath. Qty: 1 Each, Refills: 0  Start date: 7/27/2022           2. Follow-up Information       Follow up With Specialties Details Why Contact Info    primary care doctor or clinic  Schedule an appointment as soon as possible for a visit  As needed     [facility] EMERGENCY DEPT Emergency Medicine  As needed [place]  [phone]          3. Return to ED if worse       I, Mago Rosales MD, am the attending of record for this patient encounter. Diagnosis     Clinical Impression:   1. Mild intermittent asthma without complication    2. Encounter for screening laboratory testing for COVID-19 virus    3. Tobacco abuse        Attestation:  I personally performed the services described in this documentation on this date 7/27/2022 for patient Steve Ventura.   Guadalupe Workman MD